# Patient Record
Sex: FEMALE | Race: WHITE | NOT HISPANIC OR LATINO | URBAN - METROPOLITAN AREA
[De-identification: names, ages, dates, MRNs, and addresses within clinical notes are randomized per-mention and may not be internally consistent; named-entity substitution may affect disease eponyms.]

---

## 2023-03-16 ENCOUNTER — INPATIENT (INPATIENT)
Facility: HOSPITAL | Age: 58
LOS: 7 days | Discharge: ANOTHER HOSPITAL | DRG: 432 | End: 2023-03-24
Attending: STUDENT IN AN ORGANIZED HEALTH CARE EDUCATION/TRAINING PROGRAM | Admitting: STUDENT IN AN ORGANIZED HEALTH CARE EDUCATION/TRAINING PROGRAM
Payer: COMMERCIAL

## 2023-03-16 VITALS
OXYGEN SATURATION: 92 % | WEIGHT: 173.5 LBS | RESPIRATION RATE: 18 BRPM | DIASTOLIC BLOOD PRESSURE: 70 MMHG | SYSTOLIC BLOOD PRESSURE: 112 MMHG | TEMPERATURE: 98 F | HEART RATE: 99 BPM

## 2023-03-16 DIAGNOSIS — K72.90 HEPATIC FAILURE, UNSPECIFIED WITHOUT COMA: ICD-10-CM

## 2023-03-16 DIAGNOSIS — I82.0 BUDD-CHIARI SYNDROME: ICD-10-CM

## 2023-03-16 DIAGNOSIS — Z29.9 ENCOUNTER FOR PROPHYLACTIC MEASURES, UNSPECIFIED: ICD-10-CM

## 2023-03-16 DIAGNOSIS — R60.0 LOCALIZED EDEMA: ICD-10-CM

## 2023-03-16 DIAGNOSIS — R18.8 OTHER ASCITES: ICD-10-CM

## 2023-03-16 DIAGNOSIS — F10.10 ALCOHOL ABUSE, UNCOMPLICATED: ICD-10-CM

## 2023-03-16 DIAGNOSIS — R09.89 OTHER SPECIFIED SYMPTOMS AND SIGNS INVOLVING THE CIRCULATORY AND RESPIRATORY SYSTEMS: ICD-10-CM

## 2023-03-16 DIAGNOSIS — K92.2 GASTROINTESTINAL HEMORRHAGE, UNSPECIFIED: ICD-10-CM

## 2023-03-16 LAB
ALBUMIN SERPL ELPH-MCNC: 2.3 G/DL — LOW (ref 3.3–5)
ALP SERPL-CCNC: 162 U/L — HIGH (ref 40–120)
ALT FLD-CCNC: 56 U/L — HIGH (ref 10–45)
ANION GAP SERPL CALC-SCNC: 11 MMOL/L — SIGNIFICANT CHANGE UP (ref 5–17)
ANISOCYTOSIS BLD QL: SLIGHT — SIGNIFICANT CHANGE UP
APTT BLD: 48.2 SEC — HIGH (ref 27.5–35.5)
AST SERPL-CCNC: 175 U/L — HIGH (ref 10–40)
BASOPHILS # BLD AUTO: 0.14 K/UL — SIGNIFICANT CHANGE UP (ref 0–0.2)
BASOPHILS NFR BLD AUTO: 1 % — SIGNIFICANT CHANGE UP (ref 0–2)
BILIRUB DIRECT SERPL-MCNC: >10 MG/DL — HIGH (ref 0–0.3)
BILIRUB INDIRECT FLD-MCNC: <8.1 MG/DL — HIGH (ref 0.2–1)
BILIRUB SERPL-MCNC: 18.1 MG/DL — HIGH (ref 0.2–1.2)
BUN SERPL-MCNC: 6 MG/DL — LOW (ref 7–23)
CALCIUM SERPL-MCNC: 7.7 MG/DL — LOW (ref 8.4–10.5)
CHLORIDE SERPL-SCNC: 96 MMOL/L — SIGNIFICANT CHANGE UP (ref 96–108)
CO2 SERPL-SCNC: 26 MMOL/L — SIGNIFICANT CHANGE UP (ref 22–31)
CREAT SERPL-MCNC: 0.4 MG/DL — LOW (ref 0.5–1.3)
DACRYOCYTES BLD QL SMEAR: SLIGHT — SIGNIFICANT CHANGE UP
EGFR: 115 ML/MIN/1.73M2 — SIGNIFICANT CHANGE UP
EOSINOPHIL # BLD AUTO: 0 K/UL — SIGNIFICANT CHANGE UP (ref 0–0.5)
EOSINOPHIL NFR BLD AUTO: 0 % — SIGNIFICANT CHANGE UP (ref 0–6)
GLUCOSE SERPL-MCNC: 94 MG/DL — SIGNIFICANT CHANGE UP (ref 70–99)
HAV IGM SER-ACNC: SIGNIFICANT CHANGE UP
HBV CORE IGM SER-ACNC: SIGNIFICANT CHANGE UP
HBV SURFACE AG SER-ACNC: SIGNIFICANT CHANGE UP
HCT VFR BLD CALC: 34.8 % — SIGNIFICANT CHANGE UP (ref 34.5–45)
HCV AB S/CO SERPL IA: 0.19 S/CO — SIGNIFICANT CHANGE UP (ref 0–0.99)
HCV AB SERPL-IMP: SIGNIFICANT CHANGE UP
HGB BLD-MCNC: 12 G/DL — SIGNIFICANT CHANGE UP (ref 11.5–15.5)
INR BLD: 2.58 RATIO — HIGH (ref 0.88–1.16)
LYMPHOCYTES # BLD AUTO: 0.29 K/UL — LOW (ref 1–3.3)
LYMPHOCYTES # BLD AUTO: 2 % — LOW (ref 13–44)
MACROCYTES BLD QL: SLIGHT — SIGNIFICANT CHANGE UP
MANUAL SMEAR VERIFICATION: SIGNIFICANT CHANGE UP
MCHC RBC-ENTMCNC: 34.5 GM/DL — SIGNIFICANT CHANGE UP (ref 32–36)
MCHC RBC-ENTMCNC: 36.1 PG — HIGH (ref 27–34)
MCV RBC AUTO: 104.8 FL — HIGH (ref 80–100)
MONOCYTES # BLD AUTO: 1.45 K/UL — HIGH (ref 0–0.9)
MONOCYTES NFR BLD AUTO: 10 % — SIGNIFICANT CHANGE UP (ref 2–14)
NEUTROPHILS # BLD AUTO: 12.6 K/UL — HIGH (ref 1.8–7.4)
NEUTROPHILS NFR BLD AUTO: 86 % — HIGH (ref 43–77)
NEUTS BAND # BLD: 1 % — SIGNIFICANT CHANGE UP (ref 0–8)
NRBC # BLD: 0 /100 — SIGNIFICANT CHANGE UP (ref 0–0)
PLAT MORPH BLD: NORMAL — SIGNIFICANT CHANGE UP
PLATELET # BLD AUTO: 139 K/UL — LOW (ref 150–400)
POIKILOCYTOSIS BLD QL AUTO: SLIGHT — SIGNIFICANT CHANGE UP
POTASSIUM SERPL-MCNC: 3.2 MMOL/L — LOW (ref 3.5–5.3)
POTASSIUM SERPL-SCNC: 3.2 MMOL/L — LOW (ref 3.5–5.3)
PROT SERPL-MCNC: 5.8 G/DL — LOW (ref 6–8.3)
PROTHROM AB SERPL-ACNC: 30 SEC — HIGH (ref 10.5–13.4)
RBC # BLD: 3.32 M/UL — LOW (ref 3.8–5.2)
RBC # FLD: 17.2 % — HIGH (ref 10.3–14.5)
RBC BLD AUTO: ABNORMAL
SARS-COV-2 RNA SPEC QL NAA+PROBE: SIGNIFICANT CHANGE UP
SMUDGE CELLS # BLD: PRESENT — SIGNIFICANT CHANGE UP
SODIUM SERPL-SCNC: 133 MMOL/L — LOW (ref 135–145)
TARGETS BLD QL SMEAR: SLIGHT — SIGNIFICANT CHANGE UP
WBC # BLD: 14.48 K/UL — HIGH (ref 3.8–10.5)
WBC # FLD AUTO: 14.48 K/UL — HIGH (ref 3.8–10.5)

## 2023-03-16 PROCEDURE — 71046 X-RAY EXAM CHEST 2 VIEWS: CPT | Mod: 26

## 2023-03-16 PROCEDURE — 93356 MYOCRD STRAIN IMG SPCKL TRCK: CPT

## 2023-03-16 PROCEDURE — 93306 TTE W/DOPPLER COMPLETE: CPT | Mod: 26

## 2023-03-16 PROCEDURE — 99253 IP/OBS CNSLTJ NEW/EST LOW 45: CPT | Mod: GC

## 2023-03-16 PROCEDURE — 76705 ECHO EXAM OF ABDOMEN: CPT | Mod: 26

## 2023-03-16 PROCEDURE — 93971 EXTREMITY STUDY: CPT | Mod: 26,LT

## 2023-03-16 PROCEDURE — 99223 1ST HOSP IP/OBS HIGH 75: CPT

## 2023-03-16 PROCEDURE — 99221 1ST HOSP IP/OBS SF/LOW 40: CPT

## 2023-03-16 PROCEDURE — 12345: CPT | Mod: NC

## 2023-03-16 PROCEDURE — 93971 EXTREMITY STUDY: CPT | Mod: 26,RT

## 2023-03-16 RX ORDER — FUROSEMIDE 40 MG
40 TABLET ORAL ONCE
Refills: 0 | Status: COMPLETED | OUTPATIENT
Start: 2023-03-16 | End: 2023-03-16

## 2023-03-16 RX ORDER — CEFTRIAXONE 500 MG/1
2000 INJECTION, POWDER, FOR SOLUTION INTRAMUSCULAR; INTRAVENOUS EVERY 24 HOURS
Refills: 0 | Status: DISCONTINUED | OUTPATIENT
Start: 2023-03-17 | End: 2023-03-18

## 2023-03-16 RX ORDER — CEFTRIAXONE 500 MG/1
1000 INJECTION, POWDER, FOR SOLUTION INTRAMUSCULAR; INTRAVENOUS ONCE
Refills: 0 | Status: COMPLETED | OUTPATIENT
Start: 2023-03-16 | End: 2023-03-16

## 2023-03-16 RX ORDER — ONDANSETRON 8 MG/1
4 TABLET, FILM COATED ORAL EVERY 8 HOURS
Refills: 0 | Status: DISCONTINUED | OUTPATIENT
Start: 2023-03-16 | End: 2023-03-24

## 2023-03-16 RX ORDER — THIAMINE MONONITRATE (VIT B1) 100 MG
100 TABLET ORAL DAILY
Refills: 0 | Status: COMPLETED | OUTPATIENT
Start: 2023-03-16 | End: 2023-03-18

## 2023-03-16 RX ORDER — LANOLIN ALCOHOL/MO/W.PET/CERES
3 CREAM (GRAM) TOPICAL AT BEDTIME
Refills: 0 | Status: DISCONTINUED | OUTPATIENT
Start: 2023-03-16 | End: 2023-03-24

## 2023-03-16 RX ORDER — FOLIC ACID 0.8 MG
1 TABLET ORAL DAILY
Refills: 0 | Status: DISCONTINUED | OUTPATIENT
Start: 2023-03-16 | End: 2023-03-24

## 2023-03-16 RX ORDER — CEFTRIAXONE 500 MG/1
1000 INJECTION, POWDER, FOR SOLUTION INTRAMUSCULAR; INTRAVENOUS EVERY 12 HOURS
Refills: 0 | Status: DISCONTINUED | OUTPATIENT
Start: 2023-03-16 | End: 2023-03-16

## 2023-03-16 RX ADMIN — CEFTRIAXONE 100 MILLIGRAM(S): 500 INJECTION, POWDER, FOR SOLUTION INTRAMUSCULAR; INTRAVENOUS at 14:27

## 2023-03-16 RX ADMIN — Medication 40 MILLIGRAM(S): at 17:42

## 2023-03-16 RX ADMIN — Medication 1 MILLIGRAM(S): at 14:27

## 2023-03-16 RX ADMIN — CEFTRIAXONE 100 MILLIGRAM(S): 500 INJECTION, POWDER, FOR SOLUTION INTRAMUSCULAR; INTRAVENOUS at 06:08

## 2023-03-16 RX ADMIN — Medication 100 MILLIGRAM(S): at 14:27

## 2023-03-16 RX ADMIN — Medication 1 TABLET(S): at 14:54

## 2023-03-16 NOTE — PATIENT PROFILE ADULT - NSTRANSFEREYEGLASSESPAIRS_GEN_A_NUR
Problem: Knowledge Deficit  Goal: Patient/family/caregiver demonstrates understanding of disease process, treatment plan, medications, and discharge instructions  Description: Complete learning assessment and assess knowledge base    Interventions:  - Provide teaching at level of understanding  - Provide teaching via preferred learning methods  Outcome: Progressing
1 pair

## 2023-03-16 NOTE — H&P ADULT - NSHPPHYSICALEXAM_GEN_ALL_CORE
Vital Signs Last 24 Hrs  T(C): 36.8 (16 Mar 2023 03:16), Max: 36.8 (16 Mar 2023 03:16)  T(F): 98.3 (16 Mar 2023 03:16), Max: 98.3 (16 Mar 2023 03:16)  HR: 99 (16 Mar 2023 03:16) (99 - 99)  BP: 112/70 (16 Mar 2023 03:16) (112/70 - 112/70)  BP(mean): --  RR: 18 (16 Mar 2023 03:16) (18 - 18)  SpO2: 92% (16 Mar 2023 03:16) (92% - 92%)    Parameters below as of 16 Mar 2023 03:16  Patient On (Oxygen Delivery Method): nasal cannula  O2 Flow (L/min): 2      GENERAL: No acute distress, well-developed, mild tremors/ asterixis   HEAD:  Atraumatic, Normocephalic  ENT: EOMI, PERRLA, sclera icteric ,  moist mucosa no pharyngeal erythema or exudates   NECK: supple , no JVD   CHEST/LUNG: Clear to auscultation bilaterally; No wheeze, equal breath sounds bilaterally   BACK: No spinal tenderness,  No CVA tenderness   HEART: Regular rate and rhythm; No murmurs, rubs, or gallops  ABDOMEN:  + caput medusa , distended , not tense ,  Nontender + shifting dullness ; Bowel sounds present  EXTREMITIES:  No clubbing, cyanosis, +3 pitting  edema on LLE  , 1+ on RLE   MSK: No joint swelling or effusions, ROM intact   PSYCH: Normal behavior/affect  NEUROLOGY: AAOx3, non-focal, cranial nerves intact  SKIN: + jaundice , No rashes or open lesions

## 2023-03-16 NOTE — PROGRESS NOTE ADULT - PROBLEM SELECTOR PLAN 1
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

## 2023-03-16 NOTE — H&P ADULT - PROBLEM SELECTOR PLAN 1
LFt markedly elevated , no prior baseline liver test  for comparison , findings on imaging at  OSH concerning for Budd-Chiari Vs acute alcoholic hepatitis , will obtain updated labs now, hepatology notified   - F/u Liver tests/ coags   - acute hep panel   - hepatology consult ( GI emailed) to be followed up by day team

## 2023-03-16 NOTE — PROGRESS NOTE ADULT - SUBJECTIVE AND OBJECTIVE BOX
Patient is a 58y old  Female who presents with a chief complaint of liver failure (16 Mar 2023 11:56)      SUBJECTIVE / OVERNIGHT EVENTS:    Polish  used and daughter at bedside    feels OK but reports increasing abdominal distention and LE swelling. denies fevers, chills, abd pain, had some blood in her stool 2-3 days ago but now having brown BMs.     ROS:  14 point ROS negative in detail except stated as above    MEDICATIONS  (STANDING):  cefTRIAXone   IVPB 1000 milliGRAM(s) IV Intermittent every 12 hours  folic acid 1 milliGRAM(s) Oral daily  furosemide   Injectable 40 milliGRAM(s) IV Push once  multivitamin 1 Tablet(s) Oral daily  thiamine 100 milliGRAM(s) Oral daily    MEDICATIONS  (PRN):  melatonin 3 milliGRAM(s) Oral at bedtime PRN Insomnia  ondansetron Injectable 4 milliGRAM(s) IV Push every 8 hours PRN Nausea and/or Vomiting      CAPILLARY BLOOD GLUCOSE        I&O's Summary      PHYSICAL EXAM:  Vital Signs Last 24 Hrs  T(C): 36.8 (16 Mar 2023 03:16), Max: 36.8 (16 Mar 2023 03:16)  T(F): 98.3 (16 Mar 2023 03:16), Max: 98.3 (16 Mar 2023 03:16)  HR: 99 (16 Mar 2023 03:16) (99 - 99)  BP: 112/70 (16 Mar 2023 03:16) (112/70 - 112/70)  BP(mean): --  RR: 18 (16 Mar 2023 03:16) (18 - 18)  SpO2: 88% (16 Mar 2023 10:50) (88% - 92%)    Parameters below as of 16 Mar 2023 03:16  Patient On (Oxygen Delivery Method): nasal cannula  O2 Flow (L/min): 2    GENERAL: No acute distress, well-developed, mild tremors/ asterixis   HEAD:  Atraumatic, Normocephalic  ENT: EOMI, PERRL, sclera icteric ,  moist mucosa no pharyngeal erythema or exudates   NECK: supple , no JVD   CHEST/LUNG: Clear to auscultation bilaterally; No wheeze, equal breath sounds bilaterally   BACK: No spinal tenderness,  No CVA tenderness   HEART: Regular rate and rhythm; No murmurs, rubs, or gallops  ABDOMEN: mod distended , not tense ,  Nontender + shifting dullness ; Bowel sounds present  EXTREMITIES:  No clubbing, cyanosis, +3 pitting edema b/l  MSK: No joint swelling or effusions, ROM intact   PSYCH: Normal behavior/affect  NEUROLOGY: AAOx3, non-focal, cranial nerves intact  SKIN: + jaundice , No rashes or open lesions    LABS:                        12.0   14.48 )-----------( 139      ( 16 Mar 2023 07:07 )             34.8     03-16    133<L>  |  96  |  6<L>  ----------------------------<  94  3.2<L>   |  26  |  0.40<L>    Ca    7.7<L>      16 Mar 2023 07:11    TPro  5.8<L>  /  Alb  2.3<L>  /  TBili  18.1<H>  /  DBili  >10.0<H>  /  AST  175<H>  /  ALT  56<H>  /  AlkPhos  162<H>  03-16    PT/INR - ( 16 Mar 2023 07:11 )   PT: 30.0 sec;   INR: 2.58 ratio         PTT - ( 16 Mar 2023 07:11 )  PTT:48.2 sec          RADIOLOGY & ADDITIONAL TESTS:    Imaging Personally Reviewed:    Consultant(s) Notes Reviewed:      Care Discussed with Consultants/Other Providers:  med Np Shannan

## 2023-03-16 NOTE — H&P ADULT - NSHPREVIEWOFSYSTEMS_GEN_ALL_CORE
CONSTITUTIONAL: + weakness, no  fevers + chills  EYES/ENT: No visual changes;  No dysphagia  NECK: No pain or stiffness  RESPIRATORY: No cough, wheezing, hemoptysis; No shortness of breath  CARDIOVASCULAR: No chest pain or palpitations; + lower extremity edema  EXTREMITIES+  le edema, cyanosis, clubbing  GASTROINTESTINAL: No abdominal or epigastric pain. No nausea, vomiting, or hematemesis; No diarrhea or constipation. No melena  + hematochezia.  BACK: No back pain  GENITOURINARY: No dysuria, frequency or hematuria  NEUROLOGICAL: No numbness or weakness  MSK: no joint swelling or pain  SKIN: + jaundice , No itching, burning, rashes, or lesions   PSYCH: no agitation  All other review of systems is negative unless indicated above.

## 2023-03-16 NOTE — PHARMACOTHERAPY INTERVENTION NOTE - COMMENTS
Patient is a 58F EtOH abuse,  transferred from [facility]  for acute liver failure. Patient was initiated on ceftriaxone 1g BID for SBP treatment.     Recommendation:  1. Adjust ceftriaxone dose to 2g daily for SBP treatment      Cole Conner, PharmD  PGY-1 Pharmacy Resident  [facility] [id number]

## 2023-03-16 NOTE — PATIENT PROFILE ADULT - FALL HARM RISK - UNIVERSAL INTERVENTIONS
Bed in lowest position, wheels locked, appropriate side rails in place/Call bell, personal items and telephone in reach/Instruct patient to call for assistance before getting out of bed or chair/Non-slip footwear when patient is out of bed/[place] to call system/Physically safe environment - no spills, clutter or unnecessary equipment/Purposeful Proactive Rounding/Room/bathroom lighting operational, light cord in reach

## 2023-03-16 NOTE — H&P ADULT - PROBLEM SELECTOR PLAN 5
hold off on pharmacologic DVTppx until platelet and INR result , can initiate DVT  ppx during day time  if no contraindication, if contra-indicated , can place on SCds if LLE duplex negative for DVT

## 2023-03-16 NOTE — CONSULT NOTE ADULT - SUBJECTIVE AND OBJECTIVE BOX
Chief Complaint:  Patient is a 58y old  Female who presents with a chief complaint of liver failure (16 Mar 2023 12:13)      HPI:    Otherwise, patient denies fevers, chills, weight loss, dysphagia, odynophagia, early satiety, poor oral intake, abdominal pain, nausea, vomiting, diarrhea, melena, hematemesis, hematochezia, change in stool caliber, or family history of GI-related cancers.    Allergies:  No Known Allergies      Home Medications:    Hospital Medications:  folic acid 1 milliGRAM(s) Oral daily  furosemide   Injectable 40 milliGRAM(s) IV Push once  melatonin 3 milliGRAM(s) Oral at bedtime PRN  multivitamin 1 Tablet(s) Oral daily  ondansetron Injectable 4 milliGRAM(s) IV Push every 8 hours PRN  thiamine 100 milliGRAM(s) Oral daily      PMHX/PSHX:  Alcohol abuse    No significant past surgical history        Family history:  Father with AUD    Social History:   ETOH: +heavy ETOH use over the last few months; had been drinking ETOH before, after and while at work; last drink 3/9/23  Tobacco: denies  Illicit drug use: denies  Occupation: ; last worked 2022  Lives with her  who also drinks; has 2 children- one daughter who lives in [place] and a son in [place]  Went to [facility] (group and individual therapy) starting in 2023 but has still been drinking    ROS: 14 point ROS negative unless otherwise stated in HPI      Vital Signs:  Vital Signs Last 24 Hrs  T(C): 36.7 (16 Mar 2023 13:51), Max: 36.8 (16 Mar 2023 03:16)  T(F): 98 (16 Mar 2023 13:51), Max: 98.3 (16 Mar 2023 03:16)  HR: 85 (16 Mar 2023 13:51) (85 - 99)  BP: 127/74 (16 Mar 2023 13:51) (112/70 - 127/74)  BP(mean): --  RR: 18 (16 Mar 2023 13:51) (18 - 18)  SpO2: 95% (16 Mar 2023 13:51) (88% - 95%)    Parameters below as of 16 Mar 2023 13:51  Patient On (Oxygen Delivery Method): nasal cannula  O2 Flow (L/min): 2    Daily     Daily Weight in k.7 (16 Mar 2023 03:16)    PHYSICAL EXAM:     GENERAL:  Appears stated age, well-groomed, well-nourished, no distress  HEENT:  NC/AT,  conjunctivae clear and pink  CHEST:  Full & symmetric excursion, no increased effort, breath sounds clear  HEART:  Regular rhythm, S1, S2, no murmur/rub/S3/S4  ABDOMEN:  Soft, non-tender, non-distended, normoactive bowel sounds,    EXTREMITIES:  no cyanosis,clubbing or edema  SKIN:  No rash/erythema/ecchymoses/petechiae/wounds/abscess/warm/dry  NEURO:  Alert, oriented      LABS:                        12.0   14.48 )-----------( 139      ( 16 Mar 2023 07:07 )             34.8     03-16    133<L>  |  96  |  6<L>  ----------------------------<  94  3.2<L>   |  26  |  0.40<L>    Ca    7.7<L>      16 Mar 2023 07:11    TPro  5.8<L>  /  Alb  2.3<L>  /  TBili  18.1<H>  /  DBili  >10.0<H>  /  AST  175<H>  /  ALT  56<H>  /  AlkPhos  162<H>  03-16    LIVER FUNCTIONS - ( 16 Mar 2023 07:11 )  Alb: 2.3 g/dL / Pro: 5.8 g/dL / ALK PHOS: 162 U/L / ALT: 56 U/L / AST: 175 U/L / GGT: x           PT/INR - ( 16 Mar 2023 07:11 )   PT: 30.0 sec;   INR: 2.58 ratio         PTT - ( 16 Mar 2023 07:11 )  PTT:48.2 sec        Imaging:     ACC: [id number] EXAM:  US ABDOMEN LIMITED   ORDERED BY: LOUISE AMIN     PROCEDURE DATE:  2023          INTERPRETATION:  CLINICAL INFORMATION: 58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 Evaluate for ascites    COMPARISON: Chest radiograph 3/16/2023    TECHNIQUE: Limited ultrasound of the abdomen to evaluate for ascites.    FINDINGS:    Right Upper Quadrant: Moderate  Right Lower Quadrant: Large  Left Upper Quadrant: Moderate  Left Lower Quadrant: Moderate    OTHER: Small left pleural effusion.      IMPRESSION:  Moderate to large volume abdominopelvic ascites           Chief Complaint:  Patient is a 58y old  Female who presents with a chief complaint of liver failure (16 Mar 2023 12:13)      HPI: 58 year old English and Polish speaking F w/ AUD presented to [facility] on 3/13/23 for 2 weeks of worsening generalized weakness in setting of lower extremity edema yellowing of the eyes, and ascites and was found to have elevated liver enzymes as well as CT findings concerning for Budd-chiarri. She was treated with ABX for possible UTI and SBP prophylaxis and  transferred to [facility] for further evaluation on 3/16. History obtained from pt and her daughter. Over the last 6 months has been drinking more alcohol at home (mostly vodka). Had been drinking ETOH before, after and while at work. Works as a ; last worked 2022. She lives with her  who also drinks (but reportedly drinks less that the pt). Pt has 2 children- one daughter who lives in [place] and a son in [place]. Pt went to [facility] rehab (group and individual therapy) starting in 2023 but has still been drinking despite therapy. Last drink was 3/9/23.      Otherwise, patient denies fevers, chills, weight loss, dysphagia, odynophagia, early satiety, poor oral intake, abdominal pain, nausea, vomiting, diarrhea, melena, hematemesis, hematochezia, change in stool caliber, or family history of GI-related cancers.    Allergies:  No Known Allergies      Home Medications:    Hospital Medications:  folic acid 1 milliGRAM(s) Oral daily  furosemide   Injectable 40 milliGRAM(s) IV Push once  melatonin 3 milliGRAM(s) Oral at bedtime PRN  multivitamin 1 Tablet(s) Oral daily  ondansetron Injectable 4 milliGRAM(s) IV Push every 8 hours PRN  thiamine 100 milliGRAM(s) Oral daily      PMHX/PSHX:  Alcohol abuse    No significant past surgical history        Family history:  Father with AUD    Social History:   ETOH: +heavy ETOH use over the last few months; had been drinking ETOH before, after and while at work; last drink 3/9/23  Tobacco: denies  Illicit drug use: denies  Occupation: ; last worked 2022  Lives with her  who also drinks; has 2 children- one daughter who lives in [place] and a son in [place]  Went to [facility] (group and individual therapy) starting in 2023 but has still been drinking    ROS: 14 point ROS negative unless otherwise stated in HPI      Vital Signs:  Vital Signs Last 24 Hrs  T(C): 36.7 (16 Mar 2023 13:51), Max: 36.8 (16 Mar 2023 03:16)  T(F): 98 (16 Mar 2023 13:51), Max: 98.3 (16 Mar 2023 03:16)  HR: 85 (16 Mar 2023 13:51) (85 - 99)  BP: 127/74 (16 Mar 2023 13:51) (112/70 - 127/74)  BP(mean): --  RR: 18 (16 Mar 2023 13:51) (18 - 18)  SpO2: 95% (16 Mar 2023 13:51) (88% - 95%)    Parameters below as of 16 Mar 2023 13:51  Patient On (Oxygen Delivery Method): nasal cannula  O2 Flow (L/min): 2    Daily     Daily Weight in k.7 (16 Mar 2023 03:16)    PHYSICAL EXAM:     GENERAL:  Appears stated age, well-groomed, +chronically ill appearing  HEENT:  NC/AT, +scleral icterus  CHEST:  Full & symmetric excursion, +bibasilar crackles; +NC  HEART:  Regular rhythm, S1, S2, no murmur/rub/S3/S4  ABDOMEN:  Soft, non-tender, +moderately-distended  EXTREMITIES: 4+ BL edema  SKIN:  +jaundice  NEURO:  Alert, orientedx3, no asterixis      LABS:                        12.0   14.48 )-----------( 139      ( 16 Mar 2023 07:07 )             34.8     03-16    133<L>  |  96  |  6<L>  ----------------------------<  94  3.2<L>   |  26  |  0.40<L>    Ca    7.7<L>      16 Mar 2023 07:11    TPro  5.8<L>  /  Alb  2.3<L>  /  TBili  18.1<H>  /  DBili  >10.0<H>  /  AST  175<H>  /  ALT  56<H>  /  AlkPhos  162<H>  16    LIVER FUNCTIONS - ( 16 Mar 2023 07:11 )  Alb: 2.3 g/dL / Pro: 5.8 g/dL / ALK PHOS: 162 U/L / ALT: 56 U/L / AST: 175 U/L / GGT: x           PT/INR - ( 16 Mar 2023 07:11 )   PT: 30.0 sec;   INR: 2.58 ratio         PTT - ( 16 Mar 2023 07:11 )  PTT:48.2 sec        Imaging:     ACC: [id number] EXAM:  US ABDOMEN LIMITED   ORDERED BY: LOUISE AMIN     PROCEDURE DATE:  2023          INTERPRETATION:  CLINICAL INFORMATION: 58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 Evaluate for ascites    COMPARISON: Chest radiograph 3/16/2023    TECHNIQUE: Limited ultrasound of the abdomen to evaluate for ascites.    FINDINGS:    Right Upper Quadrant: Moderate  Right Lower Quadrant: Large  Left Upper Quadrant: Moderate  Left Lower Quadrant: Moderate    OTHER: Small left pleural effusion.      IMPRESSION:  Moderate to large volume abdominopelvic ascites

## 2023-03-16 NOTE — H&P ADULT - NSHPLABSRESULTS_GEN_ALL_CORE
Labs from OSH reviewed   Labs ordered     Imaging:  CT AP from OSH report reviewed: segmental compression of intrahepatic inferior IVC c/f Bud Chiari

## 2023-03-16 NOTE — CONSULT NOTE ADULT - SUBJECTIVE AND OBJECTIVE BOX
Interventional Radiology    Evaluate for Procedure:   para dx + tx     HPI:  58 year old female w/ Hx  of EtOH abuse,  presented to [facility] on 3/13/23 for worsening  generalized weakness in setting of lower extremity edema and ascites over the past two weeks , LFT's markedly elevated , no prior baseline liver test  for comparison , findings on imaging at  OSH concerning for Budd-Chiari Vs acute alcoholic hepatitis. Hepatology consulted  IR consulted on 3/16 for diagnotic and therapeutic paracentesis.     Allergies:   Medications (Abx/Cardiac/Anticoagulation/Blood Products)  cefTRIAXone   IVPB: 100 mL/Hr IV Intermittent (03-16 @ 06:08)    Data:    T(C): 36.8  HR: 99  BP: 112/70  RR: 18  SpO2: 88%    -WBC 14.48 / HgB 12.0 / Hct 34.8 / Plt 139  -Na 133 / Cl 96 / BUN 6 / Glucose 94  -K 3.2 / CO2 26 / Cr 0.40  -ALT 56 / Alk Phos 162 / T.Bili 18.1  -INR 2.58 / PTT 48.2    Radiology:     Assessment/Plan:   58 year old female w/ Hx  of EtOH abuse,  presented to [facility] on 3/13/23 for worsening  generalized weakness in setting of lower extremity edema and ascites over the past two weeks , LFT's markedly elevated , no prior baseline liver test  for comparison , findings on imaging at  OSH concerning for Budd-Chiari Vs acute alcoholic hepatitis. Hepatology consulted  IR consulted on 3/16 for diagnotic and therapeutic paracentesis.      - case reviewed and IR awaiting for abdominal Ultrasound prior to scheduling patient for para.       Interventional Radiology    Evaluate for Procedure:   para dx + tx     HPI:  58 year old female w/ Hx  of EtOH abuse,  presented to [facility] on 3/13/23 for worsening  generalized weakness in setting of lower extremity edema and ascites over the past two weeks , LFT's markedly elevated , no prior baseline liver test  for comparison , findings on imaging at  OSH concerning for Budd-Chiari Vs acute alcoholic hepatitis. Hepatology consulted  IR consulted on 3/16 for diagnotic and therapeutic paracentesis.     Allergies:   Medications (Abx/Cardiac/Anticoagulation/Blood Products)  cefTRIAXone   IVPB: 100 mL/Hr IV Intermittent (03-16 @ 06:08)    Data:    T(C): 36.8  HR: 99  BP: 112/70  RR: 18  SpO2: 88%    -WBC 14.48 / HgB 12.0 / Hct 34.8 / Plt 139  -Na 133 / Cl 96 / BUN 6 / Glucose 94  -K 3.2 / CO2 26 / Cr 0.40  -ALT 56 / Alk Phos 162 / T.Bili 18.1  -INR 2.58 / PTT 48.2    Radiology:     Assessment/Plan:   58 year old female w/ Hx  of EtOH abuse,  presented to [facility] on 3/13/23 for worsening  generalized weakness in setting of lower extremity edema and ascites over the past two weeks , LFT's markedly elevated , no prior baseline liver test  for comparison , findings on imaging at  OSH concerning for Budd-Chiari Vs acute alcoholic hepatitis. Hepatology consulted  IR consulted on 3/16 for diagnotic and therapeutic paracentesis.       - case reviewed and approved for 3/16  IR guided  tx+dx para today   - US 3/16 demonstrates moderate size ascites amenable for para.   - please place IR procedure order under PA Jalili   - COVID PCR results within 5 days of planned procedure  - d/w primary team      Sadaf Jalili, IR PA-C, available on TEAMS or IR callback [phone]   Interventional Radiology    Evaluate for Procedure:   para dx + tx     HPI:  58 year old female w/ Hx  of EtOH abuse,  presented to [facility] on 3/13/23 for worsening  generalized weakness in setting of lower extremity edema and ascites over the past two weeks , LFT's markedly elevated , no prior baseline liver test  for comparison , findings on imaging at  OSH concerning for Budd-Chiari Vs acute alcoholic hepatitis. Hepatology consulted  IR consulted on 3/16 for diagnotic and therapeutic paracentesis.     Allergies:   Medications (Abx/Cardiac/Anticoagulation/Blood Products)  cefTRIAXone   IVPB: 100 mL/Hr IV Intermittent (03-16 @ 06:08)    Data:    T(C): 36.8  HR: 99  BP: 112/70  RR: 18  SpO2: 88%    -WBC 14.48 / HgB 12.0 / Hct 34.8 / Plt 139  -Na 133 / Cl 96 / BUN 6 / Glucose 94  -K 3.2 / CO2 26 / Cr 0.40  -ALT 56 / Alk Phos 162 / T.Bili 18.1  -INR 2.58 / PTT 48.2    Radiology:     Assessment/Plan:   58 year old female w/ Hx  of EtOH abuse,  presented to [facility] on 3/13/23 for worsening  generalized weakness in setting of lower extremity edema and ascites over the past two weeks , LFT's markedly elevated , no prior baseline liver test  for comparison , findings on imaging at  OSH concerning for Budd-Chiari Vs acute alcoholic hepatitis. Hepatology consulted  IR consulted on 3/16 for diagnotic and therapeutic paracentesis.       - case reviewed and approved for 3/17  IR guided  tx+dx para   - US 3/16 demonstrates moderate size ascites amenable for para.   - please place IR procedure order under PA Jalili   - COVID PCR results within 5 days of planned procedure  - d/w primary team      Sadaf Jalili, IR PA-C, available on TEAMS or IR callback [phone]

## 2023-03-16 NOTE — CONSULT NOTE ADULT - ASSESSMENT
-nutrition/PT   -infectious workup- bl cx x2, UA + Ucx, dx/tx para- f/u ascitic fluid analysis  -US abd + doppler      **THIS NOTE IS NOT FINALIZED UNTIL SIGNED BY THE ATTENDING**    Chely Randall MD  GI Fellow, PGY-5  Available via Microsoft Teams    NON-URGENT CONSULTS:  Please email [email or web address]  AT NIGHT AND ON WEEKENDS:  Contact on-call GI fellow via answering service ([phone]) from 5pm-8am and on weekends/holidays  MONDAY-FRIDAY 8AM-5PM:  Pager# [phone] ([facility]) or [phone] ([facility])  GI Phone# [phone] ([facility])   58 year old English and Polish speaking F w/ AUD presented to 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 She was treated with ABX for possible UTI and SBP prophylaxis and  transferred to [facility] for further evaluation on 3/16. History obtained from pt and her daughter. Over the last 6 months has been drinking more alcohol at home (mostly vodka). Had been drinking ETOH before, after and while at work. Works as a ; last worked 12/2022. She lives with her  who also drinks (but reportedly drinks less that the pt). Pt has 2 children- one daughter who lives in [place] and a son in [place]. Pt went to [facility] ETOH rehab (group and individual therapy) starting in 01/2023 but has still been drinking despite therapy. Last drink was 3/9/23.      #AUD- MDF 94 (poor prognosis)  #c/f Budd-Chiari    Recs:  -please consult nutrition/PT/BH/SW   -infectious workup- bl cx x2, UA + Ucx, dx/tx para- f/u ascitic fluid analysis  -pending infectious workup will decide if pt is a candidate for steroids  -US abd + doppler  -upload images from [facility]  -daily CMP, CBC, INR  -c/w CTX for now for SBP ppx  -DVT ppx  -obtain TTE  -check Hep B surface Ag, Hep B core Ab, Hep C Ab + reflex RNA  -check serum MIKO, smooth muscle Ab, anti-LKM-1 Ab, alpha-1 antitrypsin, immunoglobulin panel       **THIS NOTE IS NOT FINALIZED UNTIL SIGNED BY THE ATTENDING**    Chely Randall MD  GI Fellow, PGY-5  Available via Microsoft Teams    NON-URGENT CONSULTS:  Please email [email or web address]  [email or web address]  AT NIGHT AND ON WEEKENDS:  Contact on-call GI fellow via answering service ([phone]) from 5pm-8am and on weekends/holidays  MONDAY-FRIDAY 8AM-5PM:  Pager# [phone] ([facility]) or [phone] ([facility])  GI Phone# [phone] ([facility])

## 2023-03-16 NOTE — PROGRESS NOTE ADULT - PROBLEM SELECTOR PLAN 3
could be 2/2 to HF v some portal hypertension  check 2d echo  check va duplex to r/o DVT  give lasix 40mg Iv x 1 and monitor ins ands outs

## 2023-03-16 NOTE — H&P ADULT - HISTORY OF PRESENT ILLNESS
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     Patient reports last drink was about two weeks prior to admission , she reports no headache, no abdominal pain , no nausea or vomiting , no visual , tactile or auditory hallucinations. Patient reports increased lower extremity edema , as well as abdominal girth , she reports no prior history of ascites, she was started on spironolactone about two weeks prior to admission , with minimal relief.She also reports yellowing of skin and eyes during this time.  She reports no fever but does have chills. Patient also reports loose stool sometime with red blood , no melena no hematemesis.

## 2023-03-16 NOTE — H&P ADULT - NSHPSOCIALHISTORY_GEN_ALL_CORE
, lives with    + alcohol , 2-3 glasses of vodka daily , sometime more , reports last use > 2 weeks PTA

## 2023-03-16 NOTE — H&P ADULT - PROBLEM SELECTOR PLAN 2
per patient some blood mixed with stool , was started on SBP PPX at OSH   - ceftriaxone for SBP ppx   - monitor H/H

## 2023-03-16 NOTE — H&P ADULT - PROBLEM SELECTOR PLAN 3
mild tremors , no other symptoms of withdrawal, reports last drink 2 wks ago , however , has only been at OSH for <3 days , will monitor for w/d  - monitor on CIWA   - Ativan PRN   - MV/Thiamine   - monitor mag and phos   - alcohol cessation counseling top be arranged by day team

## 2023-03-17 ENCOUNTER — RESULT REVIEW (OUTPATIENT)
Age: 58
End: 2023-03-17

## 2023-03-17 DIAGNOSIS — J96.01 ACUTE RESPIRATORY FAILURE WITH HYPOXIA: ICD-10-CM

## 2023-03-17 LAB
A1AT SERPL-MCNC: 166 MG/DL — SIGNIFICANT CHANGE UP (ref 90–200)
ALBUMIN FLD-MCNC: 0.3 G/DL — SIGNIFICANT CHANGE UP
ALBUMIN SERPL ELPH-MCNC: 1.8 G/DL — LOW (ref 3.3–5)
ALP SERPL-CCNC: 154 U/L — HIGH (ref 40–120)
ALT FLD-CCNC: 58 U/L — HIGH (ref 10–45)
AMMONIA BLD-MCNC: 78 UMOL/L — HIGH (ref 11–55)
ANION GAP SERPL CALC-SCNC: 12 MMOL/L — SIGNIFICANT CHANGE UP (ref 5–17)
APTT BLD: 47.1 SEC — HIGH (ref 27.5–35.5)
AST SERPL-CCNC: 171 U/L — HIGH (ref 10–40)
B PERT IGG+IGM PNL SER: CLEAR — SIGNIFICANT CHANGE UP
BILIRUB SERPL-MCNC: 17.9 MG/DL — HIGH (ref 0.2–1.2)
BUN SERPL-MCNC: 5 MG/DL — LOW (ref 7–23)
CALCIUM SERPL-MCNC: 7.2 MG/DL — LOW (ref 8.4–10.5)
CHLORIDE SERPL-SCNC: 97 MMOL/L — SIGNIFICANT CHANGE UP (ref 96–108)
CO2 SERPL-SCNC: 26 MMOL/L — SIGNIFICANT CHANGE UP (ref 22–31)
COLOR FLD: YELLOW — SIGNIFICANT CHANGE UP
CREAT SERPL-MCNC: 0.42 MG/DL — LOW (ref 0.5–1.3)
EGFR: 114 ML/MIN/1.73M2 — SIGNIFICANT CHANGE UP
EOSINOPHIL # FLD: 1 % — SIGNIFICANT CHANGE UP
FLUID INTAKE SUBSTANCE CLASS: SIGNIFICANT CHANGE UP
GLUCOSE FLD-MCNC: 116 MG/DL — SIGNIFICANT CHANGE UP
GLUCOSE SERPL-MCNC: 73 MG/DL — SIGNIFICANT CHANGE UP (ref 70–99)
GRAM STN FLD: SIGNIFICANT CHANGE UP
HCT VFR BLD CALC: 33.2 % — LOW (ref 34.5–45)
HGB BLD-MCNC: 11.4 G/DL — LOW (ref 11.5–15.5)
INR BLD: 2.49 RATIO — HIGH (ref 0.88–1.16)
LDH SERPL L TO P-CCNC: 65 U/L — SIGNIFICANT CHANGE UP
LYMPHOCYTES # FLD: 61 % — SIGNIFICANT CHANGE UP
MCHC RBC-ENTMCNC: 34.3 GM/DL — SIGNIFICANT CHANGE UP (ref 32–36)
MCHC RBC-ENTMCNC: 36.2 PG — HIGH (ref 27–34)
MCV RBC AUTO: 105.4 FL — HIGH (ref 80–100)
MELD SCORE WITH DIALYSIS: >40 POINTS — SIGNIFICANT CHANGE UP
MELD SCORE WITHOUT DIALYSIS: 29 POINTS — SIGNIFICANT CHANGE UP
MESOTHL CELL # FLD: 6 % — SIGNIFICANT CHANGE UP
MONOS+MACROS # FLD: 28 % — SIGNIFICANT CHANGE UP
NEUTROPHILS-BODY FLUID: 4 % — SIGNIFICANT CHANGE UP
NRBC # BLD: 0 /100 WBCS — SIGNIFICANT CHANGE UP (ref 0–0)
PLATELET # BLD AUTO: 165 K/UL — SIGNIFICANT CHANGE UP (ref 150–400)
POTASSIUM SERPL-MCNC: 3.2 MMOL/L — LOW (ref 3.5–5.3)
POTASSIUM SERPL-SCNC: 3.2 MMOL/L — LOW (ref 3.5–5.3)
PROT FLD-MCNC: 0.7 G/DL — SIGNIFICANT CHANGE UP
PROT SERPL-MCNC: 5.6 G/DL — LOW (ref 6–8.3)
PROTHROM AB SERPL-ACNC: 29.2 SEC — HIGH (ref 10.5–13.4)
RBC # BLD: 3.15 M/UL — LOW (ref 3.8–5.2)
RBC # FLD: 17.1 % — HIGH (ref 10.3–14.5)
RCV VOL RI: < 2000 /UL — SIGNIFICANT CHANGE UP (ref 0–0)
SODIUM SERPL-SCNC: 135 MMOL/L — SIGNIFICANT CHANGE UP (ref 135–145)
SPECIMEN SOURCE: SIGNIFICANT CHANGE UP
TOTAL NUCLEATED CELL COUNT, BODY FLUID: 68 /UL — SIGNIFICANT CHANGE UP
TUBE TYPE: SIGNIFICANT CHANGE UP
WBC # BLD: 13.89 K/UL — HIGH (ref 3.8–10.5)
WBC # FLD AUTO: 13.89 K/UL — HIGH (ref 3.8–10.5)

## 2023-03-17 PROCEDURE — 88305 TISSUE EXAM BY PATHOLOGIST: CPT | Mod: 26

## 2023-03-17 PROCEDURE — 99232 SBSQ HOSP IP/OBS MODERATE 35: CPT | Mod: GC

## 2023-03-17 PROCEDURE — 88112 CYTOPATH CELL ENHANCE TECH: CPT | Mod: 26

## 2023-03-17 PROCEDURE — 93975 VASCULAR STUDY: CPT | Mod: 26

## 2023-03-17 PROCEDURE — 49083 ABD PARACENTESIS W/IMAGING: CPT

## 2023-03-17 PROCEDURE — 99232 SBSQ HOSP IP/OBS MODERATE 35: CPT

## 2023-03-17 RX ORDER — SPIRONOLACTONE 25 MG/1
50 TABLET, FILM COATED ORAL DAILY
Refills: 0 | Status: DISCONTINUED | OUTPATIENT
Start: 2023-03-18 | End: 2023-03-19

## 2023-03-17 RX ORDER — POTASSIUM CHLORIDE 20 MEQ
40 PACKET (EA) ORAL EVERY 4 HOURS
Refills: 0 | Status: COMPLETED | OUTPATIENT
Start: 2023-03-17 | End: 2023-03-17

## 2023-03-17 RX ORDER — FUROSEMIDE 40 MG
20 TABLET ORAL DAILY
Refills: 0 | Status: DISCONTINUED | OUTPATIENT
Start: 2023-03-18 | End: 2023-03-19

## 2023-03-17 RX ORDER — ENOXAPARIN SODIUM 100 MG/ML
40 INJECTION SUBCUTANEOUS EVERY 24 HOURS
Refills: 0 | Status: DISCONTINUED | OUTPATIENT
Start: 2023-03-17 | End: 2023-03-22

## 2023-03-17 RX ORDER — FUROSEMIDE 40 MG
40 TABLET ORAL DAILY
Refills: 0 | Status: DISCONTINUED | OUTPATIENT
Start: 2023-03-17 | End: 2023-03-17

## 2023-03-17 RX ADMIN — Medication 40 MILLIEQUIVALENT(S): at 15:06

## 2023-03-17 RX ADMIN — Medication 1 MILLIGRAM(S): at 11:37

## 2023-03-17 RX ADMIN — CEFTRIAXONE 100 MILLIGRAM(S): 500 INJECTION, POWDER, FOR SOLUTION INTRAMUSCULAR; INTRAVENOUS at 06:07

## 2023-03-17 RX ADMIN — Medication 40 MILLIEQUIVALENT(S): at 17:35

## 2023-03-17 RX ADMIN — Medication 100 MILLIGRAM(S): at 11:37

## 2023-03-17 RX ADMIN — Medication 40 MILLIGRAM(S): at 11:36

## 2023-03-17 RX ADMIN — ENOXAPARIN SODIUM 40 MILLIGRAM(S): 100 INJECTION SUBCUTANEOUS at 17:35

## 2023-03-17 RX ADMIN — Medication 1 TABLET(S): at 11:37

## 2023-03-17 NOTE — PROGRESS NOTE ADULT - SUBJECTIVE AND OBJECTIVE BOX
Patient is a 58y old  Female who presents with a chief complaint of liver failure (17 Mar 2023 06:27)      SUBJECTIVE / OVERNIGHT EVENTS:    Polish  # [id number]    feels well. no n/v/d abdominal distention improved s/p 3.8 L removal of ascitic fluid    ROS:  14 point ROS negative in detail except stated as above    MEDICATIONS  (STANDING):  cefTRIAXone   IVPB 2000 milliGRAM(s) IV Intermittent every 24 hours  enoxaparin Injectable 40 milliGRAM(s) SubCutaneous every 24 hours  folic acid 1 milliGRAM(s) Oral daily  furosemide   Injectable 40 milliGRAM(s) IV Push daily  multivitamin 1 Tablet(s) Oral daily  potassium chloride    Tablet ER 40 milliEquivalent(s) Oral every 4 hours  thiamine 100 milliGRAM(s) Oral daily    MEDICATIONS  (PRN):  melatonin 3 milliGRAM(s) Oral at bedtime PRN Insomnia  ondansetron Injectable 4 milliGRAM(s) IV Push every 8 hours PRN Nausea and/or Vomiting      CAPILLARY BLOOD GLUCOSE        I&O's Summary    16 Mar 2023 07:01  -  17 Mar 2023 07:00  --------------------------------------------------------  IN: 480 mL / OUT: 900 mL / NET: -420 mL        PHYSICAL EXAM:  Vital Signs Last 24 Hrs  T(C): 36.7 (17 Mar 2023 12:10), Max: 36.8 (16 Mar 2023 20:41)  T(F): 98.1 (17 Mar 2023 12:10), Max: 98.2 (16 Mar 2023 20:41)  HR: 104 (17 Mar 2023 12:10) (85 - 104)  BP: 108/64 (17 Mar 2023 12:10) (101/70 - 127/74)  BP(mean): --  RR: 18 (17 Mar 2023 12:10) (18 - 18)  SpO2: 92% (17 Mar 2023 12:10) (92% - 96%)    Parameters below as of 17 Mar 2023 12:10  Patient On (Oxygen Delivery Method): nasal cannula  O2 Flow (L/min): 2.5    GENERAL: No acute distress, well-developed, mild tremors/ asterixis   HEAD:  Atraumatic, Normocephalic  ENT: EOMI, PERRL, sclera icteric ,  moist mucosa no pharyngeal erythema or exudates   NECK: supple , no JVD   CHEST/LUNG: Clear to auscultation bilaterally; No wheeze, equal breath sounds bilaterally   BACK: No spinal tenderness,  No CVA tenderness   HEART: Regular rate and rhythm; No murmurs, rubs, or gallops  ABDOMEN: mod distended , not tense ,  Nontender + shifting dullness ; Bowel sounds present  EXTREMITIES:  No clubbing, cyanosis, +3 pitting edema b/l  MSK: No joint swelling or effusions, ROM intact   PSYCH: Normal behavior/affect  NEUROLOGY: AAOx3, non-focal, cranial nerves intact  SKIN: + jaundice , No rashes or open lesions      LABS:                        11.4   13.89 )-----------( 165      ( 17 Mar 2023 07:13 )             33.2     03-17    135  |  97  |  5<L>  ----------------------------<  73  3.2<L>   |  26  |  0.42<L>    Ca    7.2<L>      17 Mar 2023 07:11    TPro  5.6<L>  /  Alb  1.8<L>  /  TBili  17.9<H>  /  DBili  x   /  AST  171<H>  /  ALT  58<H>  /  AlkPhos  154<H>  03-17    PT/INR - ( 17 Mar 2023 07:16 )   PT: 29.2 sec;   INR: 2.49 ratio         PTT - ( 17 Mar 2023 07:16 )  PTT:47.1 sec          RADIOLOGY & ADDITIONAL TESTS:    Imaging Personally Reviewed:    Consultant(s) Notes Reviewed:      Care Discussed with Consultants/Other Providers:  med Np Maru

## 2023-03-17 NOTE — PROGRESS NOTE ADULT - PROBLEM SELECTOR PLAN 3
could be 2/2 to HF v some portal hypertension  Transthoracic EchocardiogramNormal left ventricular systolic function. No segmental  wall motion abnormalities. 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 Moderate mitral  regurgitation.  cards eval  b/l duplex neg for DVT  give lasix 40mg IV 3/16 and 3/17 will hold for soft SBP - states last drink 2 weeks ago, at OSH for 3 days  - MV/Thiamine   - monitor mag and phos

## 2023-03-17 NOTE — PHYSICAL THERAPY INITIAL EVALUATION ADULT - STRENGTHENING, PT EVAL
GOAL: Patient will demonstrate a 1/3 increase in strength where deficient to assist with performing functional mobility and ADLs in 3 weeks.

## 2023-03-17 NOTE — PROGRESS NOTE ADULT - SUBJECTIVE AND OBJECTIVE BOX
Chief Complaint:  Patient is a 58y old  Female who presents with a chief complaint of liver failure (16 Mar 2023 15:56)      Interval Events: Reports feeling well. Denies any N/V/D/C, abd pain, melena or hematochezia.      Hospital Medications:  cefTRIAXone   IVPB 2000 milliGRAM(s) IV Intermittent every 24 hours  folic acid 1 milliGRAM(s) Oral daily  melatonin 3 milliGRAM(s) Oral at bedtime PRN  multivitamin 1 Tablet(s) Oral daily  ondansetron Injectable 4 milliGRAM(s) IV Push every 8 hours PRN  thiamine 100 milliGRAM(s) Oral daily      PMHX/PSHX:  Alcohol abuse    No significant past surgical history            ROS: 14 point ROS negative unless otherwise stated in subjective      PHYSICAL EXAM:     GENERAL:  Appears stated age, well-groomed, +chronically ill appearing  HEENT:  NC/AT, +scleral icterus  CHEST:  Full & symmetric excursion, +bibasilar crackles; +NC  HEART:  Regular rhythm, S1, S2, no murmur/rub/S3/S4  ABDOMEN:  Soft, non-tender, +moderately-distended  EXTREMITIES: 4+ BL edema  SKIN:  +jaundice  NEURO:  Alert, orientedx3, no asterixis    Vital Signs:  Vital Signs Last 24 Hrs  T(C): 36.7 (17 Mar 2023 04:23), Max: 36.8 (16 Mar 2023 20:41)  T(F): 98.1 (17 Mar 2023 04:23), Max: 98.2 (16 Mar 2023 20:41)  HR: 95 (17 Mar 2023 04:23) (85 - 100)  BP: 101/70 (17 Mar 2023 04:23) (101/70 - 127/74)  BP(mean): --  RR: 18 (17 Mar 2023 04:23) (18 - 18)  SpO2: 95% (17 Mar 2023 04:23) (88% - 96%)    Parameters below as of 17 Mar 2023 04:23  Patient On (Oxygen Delivery Method): nasal cannula  O2 Flow (L/min): 2.5    Daily     Daily     LABS:                        12.0   14.48 )-----------( 139      ( 16 Mar 2023 07:07 )             34.8     03-16    133<L>  |  96  |  6<L>  ----------------------------<  94  3.2<L>   |  26  |  0.40<L>    Ca    7.7<L>      16 Mar 2023 07:11    TPro  5.8<L>  /  Alb  2.3<L>  /  TBili  18.1<H>  /  DBili  >10.0<H>  /  AST  175<H>  /  ALT  56<H>  /  AlkPhos  162<H>  03-16    LIVER FUNCTIONS - ( 16 Mar 2023 07:11 )  Alb: 2.3 g/dL / Pro: 5.8 g/dL / ALK PHOS: 162 U/L / ALT: 56 U/L / AST: 175 U/L / GGT: x           PT/INR - ( 16 Mar 2023 07:11 )   PT: 30.0 sec;   INR: 2.58 ratio         PTT - ( 16 Mar 2023 07:11 )  PTT:48.2 sec        Imaging: No new abdominal imaging               Chief Complaint:  Patient is a 58y old  Female who presents with a chief complaint of liver failure (16 Mar 2023 15:56)      Interval Events: Reports improved s/p paracentesis this AM.  -s/p para with 3.9 L removed, neg for SBP.  -US venous duplex (3/16) BL LE neg for DVT.    Hospital Medications:  cefTRIAXone   IVPB 2000 milliGRAM(s) IV Intermittent every 24 hours  folic acid 1 milliGRAM(s) Oral daily  melatonin 3 milliGRAM(s) Oral at bedtime PRN  multivitamin 1 Tablet(s) Oral daily  ondansetron Injectable 4 milliGRAM(s) IV Push every 8 hours PRN  thiamine 100 milliGRAM(s) Oral daily      PMHX/PSHX:  Alcohol abuse    No significant past surgical history            ROS: 14 point ROS negative unless otherwise stated in subjective      PHYSICAL EXAM:     GENERAL:  Appears stated age, well-groomed, +chronically ill appearing  HEENT:  NC/AT, +scleral icterus  CHEST:  Full & symmetric excursion, +bibasilar crackles; +NC  HEART:  Regular rhythm, S1, S2, no murmur/rub/S3/S4  ABDOMEN:  Soft, non-tender, +mildly-distended  EXTREMITIES: 3+ BL edema  SKIN:  +jaundice  NEURO:  Alert, orientedx3, no asterixis    Vital Signs:  Vital Signs Last 24 Hrs  T(C): 36.7 (17 Mar 2023 04:23), Max: 36.8 (16 Mar 2023 20:41)  T(F): 98.1 (17 Mar 2023 04:23), Max: 98.2 (16 Mar 2023 20:41)  HR: 95 (17 Mar 2023 04:23) (85 - 100)  BP: 101/70 (17 Mar 2023 04:23) (101/70 - 127/74)  BP(mean): --  RR: 18 (17 Mar 2023 04:23) (18 - 18)  SpO2: 95% (17 Mar 2023 04:23) (88% - 96%)    Parameters below as of 17 Mar 2023 04:23  Patient On (Oxygen Delivery Method): nasal cannula  O2 Flow (L/min): 2.5    Daily     Daily     LABS:                        12.0   14.48 )-----------( 139      ( 16 Mar 2023 07:07 )             34.8     03-16    133<L>  |  96  |  6<L>  ----------------------------<  94  3.2<L>   |  26  |  0.40<L>    Ca    7.7<L>      16 Mar 2023 07:11    TPro  5.8<L>  /  Alb  2.3<L>  /  TBili  18.1<H>  /  DBili  >10.0<H>  /  AST  175<H>  /  ALT  56<H>  /  AlkPhos  162<H>  03-16    LIVER FUNCTIONS - ( 16 Mar 2023 07:11 )  Alb: 2.3 g/dL / Pro: 5.8 g/dL / ALK PHOS: 162 U/L / ALT: 56 U/L / AST: 175 U/L / GGT: x           PT/INR - ( 16 Mar 2023 07:11 )   PT: 30.0 sec;   INR: 2.58 ratio         PTT - ( 16 Mar 2023 07:11 )  PTT:48.2 sec        Imaging: No new abdominal imaging

## 2023-03-17 NOTE — PRE PROCEDURE NOTE - PRE PROCEDURE EVALUATION
Interventional Radiology    HPI:  58 year old female w/ Hx  of EtOH abuse,  presented to [facility] on 3/13/23 for worsening  generalized weakness in setting of lower extremity edema and ascites over the past two weeks , LFT's markedly elevated , no prior baseline liver test  for comparison , findings on imaging at  OSH concerning for Budd-Chiari Vs acute alcoholic hepatitis. Hepatology consulted  IR consulted on 3/16 for diagnotic and therapeutic paracentesis.     Allergies:   Medications (Abx/Cardiac/Anticoagulation/Blood Products)  cefTRIAXone   IVPB: 100 mL/Hr IV Intermittent (03-16 @ 06:08)  cefTRIAXone   IVPB: 100 mL/Hr IV Intermittent (03-16 @ 14:27)  cefTRIAXone   IVPB: 100 mL/Hr IV Intermittent (03-17 @ 06:07)  furosemide   Injectable: 40 milliGRAM(s) IV Push (03-16 @ 17:42)    Data:    T(C): 36.7  HR: 95  BP: 101/70  RR: 18  SpO2: 95%    Exam  General: No acute distress  Chest: Non labored breathing  Abdomen: Non-distended  Extremities: No swelling, warm    -WBC 13.89 / HgB 11.4 / Hct 33.2 / Plt 165  -Na 135 / Cl 97 / BUN 5 / Glucose 73  -K 3.2 / CO2 26 / Cr 0.42  -ALT 58 / Alk Phos 154 / T.Bili 17.9  -INR2.49    Imaging: Reviewed     Plan: 58y Female presents for diagnotic and therapeutic paracentesis.  -Risks/Benefits/alternatives explained with the patient and/or healthcare proxy and witnessed informed consent obtained.

## 2023-03-17 NOTE — PHYSICAL THERAPY INITIAL EVALUATION ADULT - PERTINENT HX OF CURRENT PROBLEM, REHAB EVAL
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 Patient reports last drink was about two weeks prior to admission , she reports no headache, no abdominal pain , no nausea or vomiting , no visual , tactile or auditory hallucinations. Patient reports increased lower extremity edema , as well as abdominal girth , she reports no prior history of ascites, she was started on spironolactone about two weeks prior to admission , with minimal relief.She also reports yellowing of skin and eyes during this time.  She reports no fever but does have chills. Patient also reports loose stool sometime with red blood , no melena no hematemesis. Hosp Course: 3/16 VA Duplex LLE neg for DVT; 3/16 CXR Small bilateral pleural effusions R > L, borderline cardiomegaly; 3/16 US Abdomen mod to large volume abdominopelvic ascites, small L pleural effusion; 3/16 RLE VA duplex neg for DVT Patient is a 58 year old female w/ Hx of EtOH abuse, presented to 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 Patient reports last drink was about two weeks prior to admission , she reports no headache, no abdominal pain , no nausea or vomiting , no visual , tactile or auditory hallucinations. Patient reports increased lower extremity edema , as well as abdominal girth , she reports no prior history of ascites, she was started on spironolactone about two weeks prior to admission , with minimal relief.She also reports yellowing of skin and eyes during this time.  She reports no fever but does have chills. Patient also reports loose stool sometime with red blood, no melena no hematemesis. Hosp Course: 3/16 VA Duplex LLE neg for DVT; 3/16 CXR Small bilateral pleural effusions R > L, borderline cardiomegaly; 3/16 US Abdomen mod to large volume abdominopelvic ascites, small L pleural effusion; 3/16 RLE VA duplex neg for DVT

## 2023-03-17 NOTE — PHYSICAL THERAPY INITIAL EVALUATION ADULT - ADDITIONAL COMMENTS
Per care coordination note 3/16, pt states that she lives in a pvt apartment with her  +6 steps to enter. Pt reports that she was independent with all ADLs and functional mobility prior to admission. Pt was previously at [facility] prior to transfer to [facility] for liver evaluation. Pt states that she lives in a pvt house with her  with a first floor setup +6 steps to enter. Pt reports that she was independent with all ADLs and functional mobility prior to admission. Pt was previously at [facility] prior to transfer to [facility] for liver evaluation.

## 2023-03-17 NOTE — PHYSICAL THERAPY INITIAL EVALUATION ADULT - BALANCE TRAINING, PT EVAL
GOAL: Patient will demonstrate an increase in static/dynamic balance in sitting/standing where deficient by at least 1 grade to facilitate greater independence during functional mobility and ADL's in 3 weeks.

## 2023-03-17 NOTE — PROGRESS NOTE ADULT - ASSESSMENT
58 year old English and Polish speaking F w/ AUD presented to 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 She was treated with ABX for possible UTI and SBP prophylaxis and  transferred to [facility] for further evaluation on 3/16. History obtained from pt and her daughter. Over the last 6 months has been drinking more alcohol at home (mostly vodka). Had been drinking ETOH before, after and while at work. Works as a ; last worked 12/2022. She lives with her  who also drinks (but reportedly drinks less that the pt). Pt has 2 children- one daughter who lives in [place] and a son in [place]. Pt went to [facility] ETOH rehab (group and individual therapy) starting in 01/2023 but has still been drinking despite therapy. Last drink was 3/9/23.      #AUD- MDF 94 (poor prognosis)  #c/f Budd-Chiari    Recs:  -please consult nutrition/PT/BH/SW   -infectious workup- bl cx x2, UA + Ucx, dx/tx para- f/u ascitic fluid analysis  -pending infectious workup will decide if pt is a candidate for steroids  -US abd + doppler  -upload images from [facility]  -daily CMP, CBC, INR  -c/w CTX for now for SBP ppx  -DVT ppx  -obtain TTE  -check Hep B surface Ag, Hep B core Ab, Hep C Ab + reflex RNA  -check serum MIKO, smooth muscle Ab, anti-LKM-1 Ab, alpha-1 antitrypsin, immunoglobulin panel       **THIS NOTE IS NOT FINALIZED UNTIL SIGNED BY THE ATTENDING**    Chely Randall MD  GI Fellow, PGY-5  Available via [employer] Teams    NON-URGENT CONSULTS:  Please email [email or web address] OR  [email or web address]  AT NIGHT AND ON WEEKENDS:  Contact on-call GI fellow via answering service ([phone]) from 5pm-8am and on weekends/holidays  MONDAY-FRIDAY 8AM-5PM:  Pager# [phone] ([facility]) or [phone] ([facility])  GI Phone# [phone] ([facility]) 58 year old English and Polish speaking F w/ AUD presented to 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 She was treated with ABX for possible UTI and SBP prophylaxis and  transferred to [facility] for further evaluation on 3/16. History obtained from pt and her daughter. Over the last 6 months has been drinking more alcohol at home (mostly vodka). Had been drinking ETOH before, after and while at work. Works as a ; last worked 12/2022. She lives with her  who also drinks (but reportedly drinks less that the pt). Pt has 2 children- one daughter who lives in [place] and a son in [place]. Pt went to OP ETOH rehab (group and individual therapy) starting in 01/2023 but has still been drinking despite therapy. Last drink was 3/9/23.      #AUD- MDF 94 (poor prognosis); UNOS/OPTN MELDNa 29 (3/17); ABO: pending  #c/f Budd-Chiari  -s/p para with 3.9 L removed, neg for SBP; SAAG > 1.1  -US venous duplex (3/16) BL LE neg for DVT.  -workup: (-)HAV IgM; (-)HBV sAg; (-)HBV cAb IgM; (-)HCV Ab    Recs:  -please consult nutrition/PT//SW   -infectious workup- bl cx x2, UA + Ucx, dx/tx para- f/u ascitic fluid analysis  -pending infectious workup will decide if pt is a candidate for steroids  -f/u US abd + doppler final read  -review uploaded images from [facility]  -check ABO blood type  -daily CMP, CBC, INR  -c/w CTX for now for SBP ppx (3/16 ->)  -c/w MVI/thiamine/folic acid  -DVT ppx  -obtain TTE  -check Hep B surface Ag, Hep B core Ab, Hep C Ab + reflex RNA  -check serum MIKO, smooth muscle Ab, anti-LKM-1 Ab, alpha-1 antitrypsin, immunoglobulin panel   -screen for EV when optimized  -start diuretics with lasix 20 mg PO daily + spironolactone 50 mg daily  -send GI PCR + C diff if patient having continued diarrhea  -will consult for LT eval; transfer to [facility] when bed is available      **THIS NOTE IS NOT FINALIZED UNTIL SIGNED BY THE ATTENDING**    Chely Randall MD  GI Fellow, PGY-5  Available via [employer] Teams    NON-URGENT CONSULTS:  Please email [email or web address]  AT NIGHT AND ON WEEKENDS:  Contact on-call GI fellow via answering service ([phone]) from 5pm-8am and on weekends/holidays  MONDAY-FRIDAY 8AM-5PM:  Pager# [phone] ([facility]) or [phone] ([facility])  GI Phone# [phone] ([facility])

## 2023-03-17 NOTE — PROGRESS NOTE ADULT - PROBLEM SELECTOR PLAN 4
lovenox 40 could be 2/2 to HF v some portal hypertension  Transthoracic EchocardiogramNormal left ventricular systolic function. No segmental  wall motion abnormalities. 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 Moderate mitral  regurgitation.  cards eval  b/l duplex neg for DVT  give lasix 40mg IV 3/16 and 3/17 will hold for soft SBP

## 2023-03-17 NOTE — PROGRESS NOTE ADULT - PROBLEM SELECTOR PLAN 2
- states last drink 2 weeks ago, at OSH for 3 days  - MV/Thiamine   - monitor mag and phos concern for Budd Chiari syndrome v alcoholic hepatitis at OSH so transferred to [facility] for further eval  hepatology consulted  ABD US ordered to eval w doppler pending  s/p IR diag/therapeutic paracenresis 3.8L removed low T protein already on empiric SBP tx, cell count not indicative of SBP but has been on abx since OSH  c/w Ceftriaxone  2gm daily, defer to hepatolgy if they want to tx empircally or switch to PPX dose   also concern for possible alcoholic hepatitis as well given hx of alcohol use-  holding off on steroids for now-  serologies pending  cultures pending

## 2023-03-17 NOTE — PROGRESS NOTE ADULT - PROBLEM SELECTOR PLAN 1
concern for Budd Chiari syndrome v alcoholic hepatitis at OSH so transferred to [facility] for further eval  hepatology consulted  ABD US ordered to eval w doppler pending  s/p IR diag/therapeutic paracenresis 3.8L removed low T protein already on empiric SBP tx, cell count not indicative of SBP but has been on abx since OSH  c/w Ceftriaxone  2gm daily, defer to hepatolgy if they want to tx empircally or switch to PPX dose   also concern for possible alcoholic hepatitis as well given hx of alcohol use-  holding off on steroids for now-  serologies pending  cultures pending 2/2 to pleural effusions ? hepatic hydrothorax, small could be 2.2, mod MR on echo normal LV systolic function  diureses prn  monitor ins and outs  keep net negative  wean 02 to maintain >92%, currently on 2.5L saturating well

## 2023-03-17 NOTE — CONSULT NOTE ADULT - SUBJECTIVE AND OBJECTIVE BOX
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     Patient reports last drink was about two weeks prior to admission , she reports no headache, no abdominal pain , no nausea or vomiting , no visual , tactile or auditory hallucinations. Patient reports increased lower extremity edema , as well as abdominal girth , she reports no prior history of ascites, she was started on spironolactone about two weeks prior to admission , with minimal relief.She also reports yellowing of skin and eyes during this time.  She reports no fever but does have chills. Patient also reports loose stool sometime with red blood , no melena no hematemesis.    (16 Mar 2023 05:05)      PAST MEDICAL & SURGICAL HISTORY:  Alcohol abuse      No significant past surgical history              MEDICATIONS:  enoxaparin Injectable 40 milliGRAM(s) SubCutaneous every 24 hours    cefTRIAXone   IVPB 2000 milliGRAM(s) IV Intermittent every 24 hours      melatonin 3 milliGRAM(s) Oral at bedtime PRN  ondansetron Injectable 4 milliGRAM(s) IV Push every 8 hours PRN        folic acid 1 milliGRAM(s) Oral daily  multivitamin 1 Tablet(s) Oral daily  thiamine 100 milliGRAM(s) Oral daily      FAMILY HISTORY:  No pertinent family history in first degree relatives        Non-contributory    SOCIAL HISTORY:    [ ] +ETOH    Allergies    No Known Allergies    Intolerances    	    REVIEW OF SYSTEMS:  CONSTITUTIONAL: No fever  EYES: No eye pain, visual disturbances, or discharge  ENMT:  No difficulty hearing, tinnitus  NECK: No pain or stiffness  RESPIRATORY: No cough, wheezing,  CARDIOVASCULAR: No chest pain, palpitations, passing out, dizziness, + leg swelling  GASTROINTESTINAL:  No nausea, vomiting, diarrhea or constipation. No melena. +Ascites  GENITOURINARY: No dysuria, hematuria  NEUROLOGICAL: No stroke like symptoms  SKIN: No burning or lesions   ENDOCRINE: No heat or cold intolerance  MUSCULOSKELETAL: No joint pain or swelling  PSYCHIATRIC: No  anxiety, mood swings  HEME/LYMPH: No bleeding gums  ALLERGY AND IMMUNOLOGIC: No hives or eczema	    All other ROS negative    PHYSICAL EXAM:  T(C): 36.7 (03-17-23 @ 12:10), Max: 36.7 (03-17-23 @ 04:23)  HR: 101 (03-17-23 @ 17:45) (95 - 104)  BP: 90/60 (03-17-23 @ 18:12) (90/60 - 108/64)  RR: 18 (03-17-23 @ 12:10) (18 - 18)  SpO2: 94% (03-17-23 @ 17:45) (92% - 95%)  Wt(kg): --  I&O's Summary    16 Mar 2023 07:01  -  17 Mar 2023 07:00  --------------------------------------------------------  IN: 480 mL / OUT: 900 mL / NET: -420 mL    17 Mar 2023 07:01  -  17 Mar 2023 21:18  --------------------------------------------------------  IN: 240 mL / OUT: 0 mL / NET: 240 mL        Appearance: Normal	  HEENT:   Normal oral mucosa, EOMI	  Cardiovascular:  S1 S2, No JVD,    Respiratory: Lungs clear to auscultation	  Psychiatry: Alert  Gastrointestinal:  Soft, Non-tender, + BS	  Skin: No rashes   Neurologic: Non-focal  Extremities:  No edema  Vascular: Peripheral pulses palpable    	    	  	  CARDIAC MARKERS:  Labs personally reviewed by me                                  11.4   13.89 )-----------( 165      ( 17 Mar 2023 07:13 )             33.2     03-17    135  |  97  |  5<L>  ----------------------------<  73  3.2<L>   |  26  |  0.42<L>    Ca    7.2<L>      17 Mar 2023 07:11    TPro  5.6<L>  /  Alb  1.8<L>  /  TBili  17.9<H>  /  DBili  x   /  AST  171<H>  /  ALT  58<H>  /  AlkPhos  154<H>  03-17        Radiology: Personally reviewed by me -   < from: Xray Chest 2 Views PA/Lat (03.16.23 @ 13:07) >  IMPRESSION: Small bilateral pleural effusions, right greater than left. Borderline cardiomegaly.    TTE Conclusions:  Normal left ventricular systolic function. No segmental wall motion abnormalities.  Possible bileallet mitral valve prolapse. Moderate mitral regurgitation.      Assessment and Plan:   · Assessment	  58F EtOH abuse,  transferred from [facility]  for acute liver failure     Problem/Plan - 1:  ·  Problem: Lower extremity edema.  ·  Plan: Likely 2/2 portal hypertension, less likely 2/2 HF  Echocardiogram. 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  ·  Plan: 2/2 to pleural effusions   Diuresis as noted above with PO Aldactone and Lasix  wean 02 to maintain >92%, currently on 2.5L saturating well.    Problem/Plan - 3:  ·  Problem: Need for prophylactic measure.   ·  Plan: lovenox 40.        Differential diagnosis and plan of care discussed with patient after the evaluation. Counseling on diet, nutritional counseling, weight management, exercise and medication compliance was done.   Advanced care planning/advanced directives discussed with patient/family. DNR status including forceful chest compressions to attempt to restart the heart, ventilator support/artificial breathing, electric shock, artificial nutrition, health care proxy, Molst form all discussed with pt. Pt wishes to consider. More than fifteen minutes spent on discussing advanced directives.           Jeffrey Melo DO [facility]  Cardiovascular Medicine  [address], [address]  Office [phone]  Available via call/text on Microsoft Teams

## 2023-03-18 LAB
A1C WITH ESTIMATED AVERAGE GLUCOSE RESULT: 4.7 % — SIGNIFICANT CHANGE UP (ref 4–5.6)
AFP-TM SERPL-MCNC: 2 NG/ML — SIGNIFICANT CHANGE UP
ALBUMIN SERPL ELPH-MCNC: 2.2 G/DL — LOW (ref 3.3–5)
ALBUMIN SERPL ELPH-MCNC: 2.2 G/DL — LOW (ref 3.3–5)
ALP SERPL-CCNC: 153 U/L — HIGH (ref 40–120)
ALP SERPL-CCNC: 153 U/L — HIGH (ref 40–120)
ALT FLD-CCNC: 55 U/L — HIGH (ref 10–45)
ALT FLD-CCNC: 56 U/L — HIGH (ref 10–45)
ANION GAP SERPL CALC-SCNC: 11 MMOL/L — SIGNIFICANT CHANGE UP (ref 5–17)
ANION GAP SERPL CALC-SCNC: 12 MMOL/L — SIGNIFICANT CHANGE UP (ref 5–17)
APPEARANCE UR: ABNORMAL
APTT BLD: 49.6 SEC — HIGH (ref 27.5–35.5)
AST SERPL-CCNC: 168 U/L — HIGH (ref 10–40)
AST SERPL-CCNC: 171 U/L — HIGH (ref 10–40)
BACTERIA # UR AUTO: NEGATIVE — SIGNIFICANT CHANGE UP
BILIRUB SERPL-MCNC: 18.8 MG/DL — HIGH (ref 0.2–1.2)
BILIRUB SERPL-MCNC: 19.2 MG/DL — HIGH (ref 0.2–1.2)
BILIRUB UR-MCNC: ABNORMAL
BLD GP AB SCN SERPL QL: NEGATIVE — SIGNIFICANT CHANGE UP
BUN SERPL-MCNC: 7 MG/DL — SIGNIFICANT CHANGE UP (ref 7–23)
BUN SERPL-MCNC: 7 MG/DL — SIGNIFICANT CHANGE UP (ref 7–23)
CALCIUM SERPL-MCNC: 7 MG/DL — LOW (ref 8.4–10.5)
CALCIUM SERPL-MCNC: 7 MG/DL — LOW (ref 8.4–10.5)
CERULOPLASMIN SERPL-MCNC: 17 MG/DL — SIGNIFICANT CHANGE UP (ref 16–45)
CHLORIDE SERPL-SCNC: 97 MMOL/L — SIGNIFICANT CHANGE UP (ref 96–108)
CHLORIDE SERPL-SCNC: 98 MMOL/L — SIGNIFICANT CHANGE UP (ref 96–108)
CHOLEST SERPL-MCNC: 72 MG/DL — SIGNIFICANT CHANGE UP
CMV IGG FLD QL: <0.2 U/ML — SIGNIFICANT CHANGE UP
CMV IGG SERPL-IMP: NEGATIVE — SIGNIFICANT CHANGE UP
CO2 SERPL-SCNC: 24 MMOL/L — SIGNIFICANT CHANGE UP (ref 22–31)
CO2 SERPL-SCNC: 25 MMOL/L — SIGNIFICANT CHANGE UP (ref 22–31)
COLOR SPEC: ABNORMAL
COVID-19 SPIKE DOMAIN AB INTERP: POSITIVE
COVID-19 SPIKE DOMAIN ANTIBODY RESULT: >250 U/ML — HIGH
CREAT SERPL-MCNC: 0.47 MG/DL — LOW (ref 0.5–1.3)
CREAT SERPL-MCNC: 0.48 MG/DL — LOW (ref 0.5–1.3)
DIFF PNL FLD: NEGATIVE — SIGNIFICANT CHANGE UP
EBV EA AB SER IA-ACNC: <5 U/ML — SIGNIFICANT CHANGE UP
EBV EA AB TITR SER IF: POSITIVE
EBV EA IGG SER-ACNC: NEGATIVE — SIGNIFICANT CHANGE UP
EBV NA IGG SER IA-ACNC: 573 U/ML — HIGH
EBV PATRN SPEC IB-IMP: SIGNIFICANT CHANGE UP
EBV VCA IGG AVIDITY SER QL IA: POSITIVE
EBV VCA IGM SER IA-ACNC: <10 U/ML — SIGNIFICANT CHANGE UP
EBV VCA IGM SER IA-ACNC: >750 U/ML — HIGH
EBV VCA IGM TITR FLD: NEGATIVE — SIGNIFICANT CHANGE UP
EGFR: 110 ML/MIN/1.73M2 — SIGNIFICANT CHANGE UP
EGFR: 111 ML/MIN/1.73M2 — SIGNIFICANT CHANGE UP
EPI CELLS # UR: 1 /HPF — SIGNIFICANT CHANGE UP
ESTIMATED AVERAGE GLUCOSE: 88 MG/DL — SIGNIFICANT CHANGE UP (ref 68–114)
FERRITIN SERPL-MCNC: 494 NG/ML — HIGH (ref 15–150)
GI PCR PANEL: SIGNIFICANT CHANGE UP
GLUCOSE SERPL-MCNC: 127 MG/DL — HIGH (ref 70–99)
GLUCOSE SERPL-MCNC: 97 MG/DL — SIGNIFICANT CHANGE UP (ref 70–99)
GLUCOSE UR QL: NEGATIVE — SIGNIFICANT CHANGE UP
HAV IGG SER QL IA: SIGNIFICANT CHANGE UP
HAV IGM SER-ACNC: SIGNIFICANT CHANGE UP
HBV CORE AB SER-ACNC: SIGNIFICANT CHANGE UP
HBV SURFACE AB SER-ACNC: <3 MIU/ML — LOW
HBV SURFACE AB SER-ACNC: SIGNIFICANT CHANGE UP
HBV SURFACE AG SER-ACNC: SIGNIFICANT CHANGE UP
HCG SERPL-ACNC: <2 MIU/ML — SIGNIFICANT CHANGE UP
HCT VFR BLD CALC: 33.4 % — LOW (ref 34.5–45)
HCT VFR BLD CALC: 34.6 % — SIGNIFICANT CHANGE UP (ref 34.5–45)
HCV AB S/CO SERPL IA: 0.22 S/CO — SIGNIFICANT CHANGE UP (ref 0–0.99)
HCV AB SERPL-IMP: SIGNIFICANT CHANGE UP
HCV RNA FLD QL NAA+PROBE: SIGNIFICANT CHANGE UP
HCV RNA SPEC QL PROBE+SIG AMP: SIGNIFICANT CHANGE UP
HDLC SERPL-MCNC: 6 MG/DL — LOW
HGB BLD-MCNC: 12 G/DL — SIGNIFICANT CHANGE UP (ref 11.5–15.5)
HGB BLD-MCNC: 12.3 G/DL — SIGNIFICANT CHANGE UP (ref 11.5–15.5)
HIV 1+2 AB+HIV1 P24 AG SERPL QL IA: SIGNIFICANT CHANGE UP
HSV1 IGG SER-ACNC: 60.4 INDEX — HIGH
HSV1 IGG SERPL QL IA: POSITIVE
HSV2 IGG FLD-ACNC: 0.06 INDEX — SIGNIFICANT CHANGE UP
HSV2 IGG SERPL QL IA: NEGATIVE — SIGNIFICANT CHANGE UP
HYALINE CASTS # UR AUTO: 2 /LPF — SIGNIFICANT CHANGE UP (ref 0–2)
IGA FLD-MCNC: 1070 MG/DL — HIGH (ref 84–499)
IGA FLD-MCNC: 1162 MG/DL — HIGH (ref 84–499)
IGG FLD-MCNC: 1865 MG/DL — HIGH (ref 610–1660)
IGG FLD-MCNC: 2029 MG/DL — HIGH (ref 610–1660)
IGM SERPL-MCNC: 126 MG/DL — SIGNIFICANT CHANGE UP (ref 35–242)
IGM SERPL-MCNC: 145 MG/DL — SIGNIFICANT CHANGE UP (ref 35–242)
INR BLD: 2.84 RATIO — HIGH (ref 0.88–1.16)
INR BLD: 2.85 RATIO — HIGH (ref 0.88–1.16)
IRON SATN MFR SERPL: 57 UG/DL — SIGNIFICANT CHANGE UP (ref 30–160)
IRON SATN MFR SERPL: 67 % — HIGH (ref 14–50)
KAPPA LC SER QL IFE: 6.04 MG/DL — HIGH (ref 0.33–1.94)
KAPPA/LAMBDA FREE LIGHT CHAIN RATIO, SERUM: 0.73 RATIO — SIGNIFICANT CHANGE UP (ref 0.26–1.65)
KETONES UR-MCNC: NEGATIVE — SIGNIFICANT CHANGE UP
LAMBDA LC SER QL IFE: 8.24 MG/DL — HIGH (ref 0.57–2.63)
LEUKOCYTE ESTERASE UR-ACNC: NEGATIVE — SIGNIFICANT CHANGE UP
LIPID PNL WITH DIRECT LDL SERPL: 43 MG/DL — SIGNIFICANT CHANGE UP
LKM AB SER-ACNC: <20.1 UNITS — SIGNIFICANT CHANGE UP (ref 0–20)
MAGNESIUM SERPL-MCNC: 1.7 MG/DL — SIGNIFICANT CHANGE UP (ref 1.6–2.6)
MAGNESIUM SERPL-MCNC: 1.8 MG/DL — SIGNIFICANT CHANGE UP (ref 1.6–2.6)
MCHC RBC-ENTMCNC: 35.5 GM/DL — SIGNIFICANT CHANGE UP (ref 32–36)
MCHC RBC-ENTMCNC: 35.9 GM/DL — SIGNIFICANT CHANGE UP (ref 32–36)
MCHC RBC-ENTMCNC: 37.4 PG — HIGH (ref 27–34)
MCHC RBC-ENTMCNC: 37.7 PG — HIGH (ref 27–34)
MCV RBC AUTO: 105 FL — HIGH (ref 80–100)
MCV RBC AUTO: 105.2 FL — HIGH (ref 80–100)
MEV IGG SER-ACNC: >300 AU/ML — SIGNIFICANT CHANGE UP
MEV IGG+IGM SER-IMP: POSITIVE — SIGNIFICANT CHANGE UP
MUV AB SER-ACNC: POSITIVE — SIGNIFICANT CHANGE UP
MUV IGG FLD-ACNC: 32.1 AU/ML — SIGNIFICANT CHANGE UP
NITRITE UR-MCNC: NEGATIVE — SIGNIFICANT CHANGE UP
NON HDL CHOLESTEROL: 66 MG/DL — SIGNIFICANT CHANGE UP
NRBC # BLD: 0 /100 WBCS — SIGNIFICANT CHANGE UP (ref 0–0)
PH UR: 7.5 — SIGNIFICANT CHANGE UP (ref 5–8)
PHOSPHATE SERPL-MCNC: 1.4 MG/DL — LOW (ref 2.5–4.5)
PHOSPHATE SERPL-MCNC: 1.6 MG/DL — LOW (ref 2.5–4.5)
PLATELET # BLD AUTO: 172 K/UL — SIGNIFICANT CHANGE UP (ref 150–400)
PLATELET # BLD AUTO: 179 K/UL — SIGNIFICANT CHANGE UP (ref 150–400)
POTASSIUM SERPL-MCNC: 3.7 MMOL/L — SIGNIFICANT CHANGE UP (ref 3.5–5.3)
POTASSIUM SERPL-MCNC: 3.8 MMOL/L — SIGNIFICANT CHANGE UP (ref 3.5–5.3)
POTASSIUM SERPL-SCNC: 3.7 MMOL/L — SIGNIFICANT CHANGE UP (ref 3.5–5.3)
POTASSIUM SERPL-SCNC: 3.8 MMOL/L — SIGNIFICANT CHANGE UP (ref 3.5–5.3)
PROT SERPL-MCNC: 5.5 G/DL — LOW (ref 6–8.3)
PROT SERPL-MCNC: 5.6 G/DL — LOW (ref 6–8.3)
PROT UR-MCNC: ABNORMAL
PROTHROM AB SERPL-ACNC: 33 SEC — HIGH (ref 10.5–13.4)
PROTHROM AB SERPL-ACNC: 33.4 SEC — HIGH (ref 10.5–13.4)
RBC # BLD: 3.18 M/UL — LOW (ref 3.8–5.2)
RBC # BLD: 3.29 M/UL — LOW (ref 3.8–5.2)
RBC # FLD: 16.6 % — HIGH (ref 10.3–14.5)
RBC # FLD: 16.8 % — HIGH (ref 10.3–14.5)
RBC CASTS # UR COMP ASSIST: 10 /HPF — HIGH (ref 0–4)
RH IG SCN BLD-IMP: NEGATIVE — SIGNIFICANT CHANGE UP
RUBV IGG SER-ACNC: 27.1 INDEX — SIGNIFICANT CHANGE UP
RUBV IGG SER-IMP: POSITIVE — SIGNIFICANT CHANGE UP
SARS-COV-2 IGG+IGM SERPL QL IA: >250 U/ML — HIGH
SARS-COV-2 IGG+IGM SERPL QL IA: POSITIVE
SODIUM SERPL-SCNC: 133 MMOL/L — LOW (ref 135–145)
SODIUM SERPL-SCNC: 134 MMOL/L — LOW (ref 135–145)
SP GR SPEC: 1.02 — SIGNIFICANT CHANGE UP (ref 1.01–1.02)
T GONDII IGG SER QL: <3 IU/ML — SIGNIFICANT CHANGE UP
T GONDII IGG SER QL: NEGATIVE — SIGNIFICANT CHANGE UP
T4 FREE SERPL-MCNC: 1.4 NG/DL — SIGNIFICANT CHANGE UP (ref 0.9–1.8)
TIBC SERPL-MCNC: 85 UG/DL — LOW (ref 220–430)
TRIGL SERPL-MCNC: 115 MG/DL — SIGNIFICANT CHANGE UP
TSH SERPL-MCNC: 2.73 UIU/ML — SIGNIFICANT CHANGE UP (ref 0.27–4.2)
UIBC SERPL-MCNC: 28 UG/DL — LOW (ref 110–370)
UROBILINOGEN FLD QL: NEGATIVE — SIGNIFICANT CHANGE UP
VZV IGG FLD QL IA: 786.1 INDEX — SIGNIFICANT CHANGE UP
VZV IGG FLD QL IA: POSITIVE — SIGNIFICANT CHANGE UP
WBC # BLD: 15.3 K/UL — HIGH (ref 3.8–10.5)
WBC # BLD: 18 K/UL — HIGH (ref 3.8–10.5)
WBC # FLD AUTO: 15.3 K/UL — HIGH (ref 3.8–10.5)
WBC # FLD AUTO: 18 K/UL — HIGH (ref 3.8–10.5)
WBC UR QL: 1 /HPF — SIGNIFICANT CHANGE UP (ref 0–5)

## 2023-03-18 PROCEDURE — 99233 SBSQ HOSP IP/OBS HIGH 50: CPT | Mod: GC

## 2023-03-18 PROCEDURE — 71045 X-RAY EXAM CHEST 1 VIEW: CPT | Mod: 26

## 2023-03-18 PROCEDURE — 99232 SBSQ HOSP IP/OBS MODERATE 35: CPT

## 2023-03-18 RX ORDER — PIPERACILLIN AND TAZOBACTAM 4; .5 G/20ML; G/20ML
3.38 INJECTION, POWDER, LYOPHILIZED, FOR SOLUTION INTRAVENOUS EVERY 8 HOURS
Refills: 0 | Status: DISCONTINUED | OUTPATIENT
Start: 2023-03-19 | End: 2023-03-24

## 2023-03-18 RX ORDER — ACETAMINOPHEN 500 MG
650 TABLET ORAL ONCE
Refills: 0 | Status: COMPLETED | OUTPATIENT
Start: 2023-03-18 | End: 2023-03-18

## 2023-03-18 RX ORDER — PIPERACILLIN AND TAZOBACTAM 4; .5 G/20ML; G/20ML
3.38 INJECTION, POWDER, LYOPHILIZED, FOR SOLUTION INTRAVENOUS ONCE
Refills: 0 | Status: COMPLETED | OUTPATIENT
Start: 2023-03-19 | End: 2023-03-19

## 2023-03-18 RX ORDER — PIPERACILLIN AND TAZOBACTAM 4; .5 G/20ML; G/20ML
3.38 INJECTION, POWDER, LYOPHILIZED, FOR SOLUTION INTRAVENOUS ONCE
Refills: 0 | Status: COMPLETED | OUTPATIENT
Start: 2023-03-18 | End: 2023-03-18

## 2023-03-18 RX ADMIN — Medication 100 MILLIGRAM(S): at 11:39

## 2023-03-18 RX ADMIN — Medication 1 TABLET(S): at 11:39

## 2023-03-18 RX ADMIN — ENOXAPARIN SODIUM 40 MILLIGRAM(S): 100 INJECTION SUBCUTANEOUS at 18:11

## 2023-03-18 RX ADMIN — CEFTRIAXONE 100 MILLIGRAM(S): 500 INJECTION, POWDER, FOR SOLUTION INTRAMUSCULAR; INTRAVENOUS at 07:24

## 2023-03-18 RX ADMIN — PIPERACILLIN AND TAZOBACTAM 200 GRAM(S): 4; .5 INJECTION, POWDER, LYOPHILIZED, FOR SOLUTION INTRAVENOUS at 23:27

## 2023-03-18 RX ADMIN — Medication 63.75 MILLIMOLE(S): at 11:39

## 2023-03-18 RX ADMIN — SPIRONOLACTONE 50 MILLIGRAM(S): 25 TABLET, FILM COATED ORAL at 08:22

## 2023-03-18 RX ADMIN — Medication 20 MILLIGRAM(S): at 08:22

## 2023-03-18 RX ADMIN — Medication 1 MILLIGRAM(S): at 11:40

## 2023-03-18 RX ADMIN — Medication 650 MILLIGRAM(S): at 16:37

## 2023-03-18 RX ADMIN — Medication 650 MILLIGRAM(S): at 16:07

## 2023-03-18 NOTE — PROVIDER CONTACT NOTE (OTHER) - ACTION/TREATMENT ORDERED:
provider to come and assess vitals at bedside. provider to come and assess vitals at bedside. provider took BP electronically- 91/56 BP. no new orders at this time.

## 2023-03-18 NOTE — PROGRESS NOTE ADULT - SUBJECTIVE AND OBJECTIVE BOX
Chief Complaint:  Patient is a 58y old  Female who presents with a chief complaint of liver failure (16 Mar 2023 15:56)      Interval Events:   -Liver evaluation consent signed yesterday.  -US done yesterday w/ no evidence of Budd Chiari malformation.   -Awaiting bed on 6 [facility]      MEDICATIONS  (STANDING):  cefTRIAXone   IVPB 2000 milliGRAM(s) IV Intermittent every 24 hours  enoxaparin Injectable 40 milliGRAM(s) SubCutaneous every 24 hours  folic acid 1 milliGRAM(s) Oral daily  furosemide    Tablet 20 milliGRAM(s) Oral daily  multivitamin 1 Tablet(s) Oral daily  spironolactone 50 milliGRAM(s) Oral daily  thiamine 100 milliGRAM(s) Oral daily    MEDICATIONS  (PRN):  melatonin 3 milliGRAM(s) Oral at bedtime PRN Insomnia  ondansetron Injectable 4 milliGRAM(s) IV Push every 8 hours PRN Nausea and/or Vomiting      PAST MEDICAL & SURGICAL HISTORY:  Alcohol abuse      No significant past surgical history          Vital Signs Last 24 Hrs  T(C): 36.9 (17 Mar 2023 19:57), Max: 36.9 (17 Mar 2023 19:57)  T(F): 98.5 (17 Mar 2023 19:57), Max: 98.5 (17 Mar 2023 19:57)  HR: 101 (17 Mar 2023 19:57) (95 - 104)  BP: 100/68 (17 Mar 2023 19:57) (90/60 - 108/64)  BP(mean): --  RR: 18 (17 Mar 2023 19:57) (18 - 18)  SpO2: 93% (17 Mar 2023 19:57) (92% - 95%)    Parameters below as of 17 Mar 2023 19:57  Patient On (Oxygen Delivery Method): nasal cannula  O2 Flow (L/min): 2.5      I&O's Summary    16 Mar 2023 07:01  -  17 Mar 2023 07:00  --------------------------------------------------------  IN: 480 mL / OUT: 900 mL / NET: -420 mL    17 Mar 2023 07:01  -  18 Mar 2023 01:28  --------------------------------------------------------  IN: 240 mL / OUT: 0 mL / NET: 240 mL                              11.4   13.89 )-----------( 165      ( 17 Mar 2023 07:13 )             33.2     03-17    135  |  97  |  5<L>  ----------------------------<  73  3.2<L>   |  26  |  0.42<L>    Ca    7.2<L>      17 Mar 2023 07:11    TPro  5.6<L>  /  Alb  1.8<L>  /  TBili  17.9<H>  /  DBili  x   /  AST  171<H>  /  ALT  58<H>  /  AlkPhos  154<H>  03-17          Culture - Body Fluid with Gram Stain (collected 03-17-23 @ 10:49)  Source: Abdominal Fl Abdominal Fluid  Gram Stain (03-17-23 @ 18:48):    polymorphonuclear leukocytes seen    No organisms seen    by cytocentrifuge            ROS: 14 point ROS negative unless otherwise stated in subjective      PHYSICAL EXAM:     GENERAL:  Appears stated age, well-groomed, +chronically ill appearing  HEENT:  NC/AT, +scleral icterus  CHEST:  Full & symmetric excursion, +bibasilar crackles; +NC  HEART:  Regular rhythm, S1, S2, no murmur/rub/S3/S4  ABDOMEN:  Soft, non-tender, +mildly-distended  EXTREMITIES: 3+ BL edema  SKIN:  +jaundice  NEURO:  Alert, orientedx3, no asterixis

## 2023-03-18 NOTE — PROVIDER CONTACT NOTE (OTHER) - BACKGROUND
pt admitted for ascites and acute liver failure. pt BP has been dropping at times during admission to 90s systolic.

## 2023-03-18 NOTE — PROGRESS NOTE ADULT - SUBJECTIVE AND OBJECTIVE BOX
Jose Alberto Dowd MD  Division of Hospital Medicine  Available via MS teams  If no response or off hours page: [phone]  ---------------------------------------------------------    GAYLEPIPER GIBSON  58y  Female      Patient is a 58y old  Female who presents with a chief complaint of liver failure (18 Mar 2023 10:24)      INTERVAL HPI/OVERNIGHT EVENTS:  Seen at bedside. Has some slight epigastric discomfort 2/2 US done yesterday      REVIEW OF SYSTEMS: 10 point ROS negative unless listed above    T(C): 36.8 (03-18-23 @ 04:37), Max: 36.9 (03-17-23 @ 19:57)  HR: 91 (03-18-23 @ 04:37) (91 - 104)  BP: 100/63 (03-18-23 @ 08:14) (90/60 - 108/64)  RR: 18 (03-18-23 @ 04:37) (18 - 18)  SpO2: 93% (03-18-23 @ 04:37) (92% - 94%)  Wt(kg): --Vital Signs Last 24 Hrs  T(C): 36.8 (18 Mar 2023 04:37), Max: 36.9 (17 Mar 2023 19:57)  T(F): 98.3 (18 Mar 2023 04:37), Max: 98.5 (17 Mar 2023 19:57)  HR: 91 (18 Mar 2023 04:37) (91 - 104)  BP: 100/63 (18 Mar 2023 08:14) (90/60 - 108/64)  BP(mean): --  RR: 18 (18 Mar 2023 04:37) (18 - 18)  SpO2: 93% (18 Mar 2023 04:37) (92% - 94%)    Parameters below as of 18 Mar 2023 04:37  Patient On (Oxygen Delivery Method): nasal cannula  O2 Flow (L/min): 2.5      PHYSICAL EXAM:  GENERAL: No acute distress, well-developed, mild tremors/ asterixis   NECK: supple , no JVD   CHEST/LUNG: Clear to auscultation bilaterally; No wheeze, equal breath sounds bilaterally   BACK: No spinal tenderness,  No CVA tenderness   HEART: Regular rate and rhythm; No murmurs, rubs, or gallops  ABDOMEN: mod distended , not tense ,  Nontender + shifting dullness ; Bowel sounds present  EXTREMITIES:  No clubbing, cyanosis, +3 pitting edema b/l   PSYCH: Normal behavior/affect  NEUROLOGY: AAOx3, non-focal, cranial nerves intact  SKIN: + jaundice , No rashes or open lesions    Consultant(s) Notes Reviewed:  [x ] YES  [ ] NO  Care Discussed with Consultants/Other Providers [ x] YES  [ ] NO    LABS:                        12.0   15.30 )-----------( 179      ( 18 Mar 2023 08:36 )             33.4     03-18    134<L>  |  98  |  7   ----------------------------<  97  3.8   |  25  |  0.47<L>    Ca    7.0<L>      18 Mar 2023 07:20  Phos  1.6     03-18  Mg     1.8     03-18    TPro  5.5<L>  /  Alb  2.2<L>  /  TBili  18.8<H>  /  DBili  x   /  AST  171<H>  /  ALT  55<H>  /  AlkPhos  153<H>  03-18    PT/INR - ( 18 Mar 2023 07:20 )   PT: 33.4 sec;   INR: 2.85 ratio         PTT - ( 17 Mar 2023 07:16 )  PTT:47.1 sec    CAPILLARY BLOOD GLUCOSE                RADIOLOGY & ADDITIONAL TESTS:    Imaging Personally Reviewed:  [ ] YES  [ ] NO

## 2023-03-18 NOTE — PROVIDER CONTACT NOTE (OTHER) - ASSESSMENT
pt a&ox4. BP 82/56 . pt states she has no dizziness lightheadedness or blurry vision. pt resting comfortably in bed.

## 2023-03-18 NOTE — PROGRESS NOTE ADULT - SUBJECTIVE AND OBJECTIVE BOX
DATE OF SERVICE: 03-18-23 @ 10:24    Patient is a 58y old  Female who presents with a chief complaint of liver failure (18 Mar 2023 01:26)      INTERVAL HISTORY: no complaints     REVIEW OF SYSTEMS:  CONSTITUTIONAL: No weakness  EYES/ENT: No visual changes;  No throat pain   NECK: No pain or stiffness  RESPIRATORY: No cough, wheezing; No shortness of breath  CARDIOVASCULAR: No chest pain or palpitations  GASTROINTESTINAL: No abdominal  pain. No nausea, vomiting, or hematemesis  GENITOURINARY: No dysuria, frequency or hematuria  NEUROLOGICAL: No stroke like symptoms  SKIN: No rashes    	  MEDICATIONS:  furosemide    Tablet 20 milliGRAM(s) Oral daily  spironolactone 50 milliGRAM(s) Oral daily        PHYSICAL EXAM:  T(C): 36.8 (03-18-23 @ 04:37), Max: 36.9 (03-17-23 @ 19:57)  HR: 91 (03-18-23 @ 04:37) (91 - 104)  BP: 100/63 (03-18-23 @ 08:14) (90/60 - 108/64)  RR: 18 (03-18-23 @ 04:37) (18 - 18)  SpO2: 93% (03-18-23 @ 04:37) (92% - 94%)  Wt(kg): --  I&O's Summary    17 Mar 2023 07:01  -  18 Mar 2023 07:00  --------------------------------------------------------  IN: 240 mL / OUT: 0 mL / NET: 240 mL          Appearance: In no distress	  HEENT:    PERRL, EOMI	  Cardiovascular:  S1 S2, No JVD  Respiratory: Lungs clear to auscultation	  Gastrointestinal:  Soft, Non-tender, + BS	  Vascularature:  No edema of LE  Psychiatric: Appropriate affect   Neuro: no acute focal deficits                               12.0   15.30 )-----------( 179      ( 18 Mar 2023 08:36 )             33.4     03-18    134<L>  |  98  |  7   ----------------------------<  97  3.8   |  25  |  0.47<L>    Ca    7.0<L>      18 Mar 2023 07:20  Phos  1.6     03-18  Mg     1.8     03-18    TPro  5.5<L>  /  Alb  2.2<L>  /  TBili  18.8<H>  /  DBili  x   /  AST  171<H>  /  ALT  55<H>  /  AlkPhos  153<H>  03-18        Labs personally reviewed      ASSESSMENT/PLAN: 	  58F EtOH abuse,  transferred from [facility]  for acute liver failure. Polish  ID [id number] used.     Problem/Plan - 1:  ·  Problem: Lower extremity edema.  ·  Plan: Likely 2/2 portal hypertension, less likely 2/2 HF  Echocardiogram. 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 C/w Aldactone 50 mg and Lasix 20mg    Problem/Plan - 2:  ·  Problem: Acute hypoxemic respiratory failure.  ·  Plan: 2/2 to pleural effusions   Diuresis as noted above with PO Aldactone and Lasix  wean 02 to maintain >92%, currently on 2.5L saturating well.    Problem/Plan - 3:  ·  Problem: Need for prophylactic measure.   ·  Plan: lovenox 40.          SHAI Falcon DO [facility]  Cardiovascular Medicine  [address] [place] [address], [address]  Office: [phone]  Available via call/text on Microsoft Teams

## 2023-03-18 NOTE — PROVIDER CONTACT NOTE (OTHER) - SITUATION
patient BP 82/56 . BP was taken manually on both arms twice. pt states she has no dizziness or blurry vision.

## 2023-03-18 NOTE — PROGRESS NOTE ADULT - PROBLEM SELECTOR PLAN 1
2/2 to pleural effusions ? hepatic hydrothorax, small could be 2.2, mod MR on echo normal LV systolic function  c/w Lasix and Spironolactone  monitor ins and outs  keep net negative  wean 02 to maintain >92%, currently on 2.5L saturating well

## 2023-03-18 NOTE — PROGRESS NOTE ADULT - PROBLEM SELECTOR PLAN 2
concern initially for Budd Chiari syndrome v alcoholic hepatitis at OSH so transferred to [facility] for further eval  hepatology consulted  ABD US negative for budd chiari  s/p IR diag/therapeutic paracenresis 3.8L removed low T protein already on empiric SBP tx, cell count not indicative of SBP but has been on abx since OSH  c/w Ceftriaxone  2gm daily, defer to hepatology if they want to tx empircally or switch to PPX dose   also concern for possible alcoholic hepatitis as well given hx of alcohol use-  holding off on steroids for now-  serologies pending  cultures pending  Pending bed on [facility]

## 2023-03-18 NOTE — PROGRESS NOTE ADULT - PROBLEM SELECTOR PLAN 4
could be 2/2 to  portal hypertension  Transthoracic Echocardiogram Normal left ventricular systolic function. No segmental wall motion abnormalities. Possible bileaflet mitral valve prolapse. Moderate mitral  regurgitation.  cardiology following  b/l duplex neg for DVT  c/w Lasix and Spironolactone

## 2023-03-18 NOTE — CHART NOTE - NSCHARTNOTEFT_GEN_A_CORE
Fevers    Received a call from hepatology Dr Colleen Talbert to repeat blood cultures now, order UA and chest xray. D/c ceftriaxone and start Zosyn    Vital Signs Last 24 Hrs  T(C): 37.8 (18 Mar 2023 17:08), Max: 38.3 (18 Mar 2023 15:55)  T(F): 100.1 (18 Mar 2023 17:08), Max: 101 (18 Mar 2023 15:55)  HR: 100 (18 Mar 2023 17:08) (91 - 118)  BP: 113/62 (18 Mar 2023 15:38) (94/56 - 113/62)  BP(mean): --  RR: 18 (18 Mar 2023 15:38) (18 - 18)  SpO2: 97% (18 Mar 2023 15:38) (93% - 97%)    Parameters below as of 18 Mar 2023 15:38  Patient On (Oxygen Delivery Method): nasal cannula  O2 Flow (L/min): 2.5    Will endorse to primary day team, attending to follow    Shannan Burgos NP  [facility] [id number]

## 2023-03-19 DIAGNOSIS — R65.10 SYSTEMIC INFLAMMATORY RESPONSE SYNDROME (SIRS) OF NON-INFECTIOUS ORIGIN WITHOUT ACUTE ORGAN DYSFUNCTION: ICD-10-CM

## 2023-03-19 LAB
ALBUMIN SERPL ELPH-MCNC: 2.1 G/DL — LOW (ref 3.3–5)
ALP SERPL-CCNC: 138 U/L — HIGH (ref 40–120)
ALT FLD-CCNC: 54 U/L — HIGH (ref 10–45)
ANION GAP SERPL CALC-SCNC: 15 MMOL/L — SIGNIFICANT CHANGE UP (ref 5–17)
APPEARANCE UR: ABNORMAL
APTT BLD: 65.5 SEC — HIGH (ref 27.5–35.5)
AST SERPL-CCNC: 185 U/L — HIGH (ref 10–40)
BASE EXCESS BLDV CALC-SCNC: 1.3 MMOL/L — SIGNIFICANT CHANGE UP (ref -2–3)
BILIRUB SERPL-MCNC: 18 MG/DL — HIGH (ref 0.2–1.2)
BILIRUB UR-MCNC: ABNORMAL
BUN SERPL-MCNC: 10 MG/DL — SIGNIFICANT CHANGE UP (ref 7–23)
CA-I SERPL-SCNC: 1.02 MMOL/L — LOW (ref 1.15–1.33)
CALCIUM SERPL-MCNC: 7 MG/DL — LOW (ref 8.4–10.5)
CHLORIDE BLDV-SCNC: 97 MMOL/L — SIGNIFICANT CHANGE UP (ref 96–108)
CHLORIDE SERPL-SCNC: 97 MMOL/L — SIGNIFICANT CHANGE UP (ref 96–108)
CO2 BLDV-SCNC: 27 MMOL/L — HIGH (ref 22–26)
CO2 SERPL-SCNC: 21 MMOL/L — LOW (ref 22–31)
COLOR SPEC: ABNORMAL
CREAT SERPL-MCNC: 0.98 MG/DL — SIGNIFICANT CHANGE UP (ref 0.5–1.3)
DIFF PNL FLD: ABNORMAL
EGFR: 67 ML/MIN/1.73M2 — SIGNIFICANT CHANGE UP
GAS PNL BLDV: 129 MMOL/L — LOW (ref 136–145)
GAS PNL BLDV: SIGNIFICANT CHANGE UP
GLUCOSE BLDV-MCNC: 106 MG/DL — HIGH (ref 70–99)
GLUCOSE SERPL-MCNC: 108 MG/DL — HIGH (ref 70–99)
GLUCOSE UR QL: NEGATIVE — SIGNIFICANT CHANGE UP
HCO3 BLDV-SCNC: 26 MMOL/L — SIGNIFICANT CHANGE UP (ref 22–29)
HCT VFR BLD CALC: 33.4 % — LOW (ref 34.5–45)
HCT VFR BLDA CALC: 40 % — SIGNIFICANT CHANGE UP (ref 34.5–46.5)
HGB BLD CALC-MCNC: 13.2 G/DL — SIGNIFICANT CHANGE UP (ref 11.7–16.1)
HGB BLD-MCNC: 11.4 G/DL — LOW (ref 11.5–15.5)
INR BLD: 3.94 RATIO — HIGH (ref 0.88–1.16)
KETONES UR-MCNC: NEGATIVE — SIGNIFICANT CHANGE UP
LACTATE BLDV-MCNC: 3.4 MMOL/L — HIGH (ref 0.5–2)
LACTATE SERPL-SCNC: 4.3 MMOL/L — CRITICAL HIGH (ref 0.5–2)
LACTATE SERPL-SCNC: 7.4 MMOL/L — CRITICAL HIGH (ref 0.5–2)
LEUKOCYTE ESTERASE UR-ACNC: NEGATIVE — SIGNIFICANT CHANGE UP
MCHC RBC-ENTMCNC: 34.1 GM/DL — SIGNIFICANT CHANGE UP (ref 32–36)
MCHC RBC-ENTMCNC: 36.5 PG — HIGH (ref 27–34)
MCV RBC AUTO: 107.1 FL — HIGH (ref 80–100)
MITOCHONDRIA AB SER-ACNC: SIGNIFICANT CHANGE UP
NITRITE UR-MCNC: NEGATIVE — SIGNIFICANT CHANGE UP
NRBC # BLD: 0 /100 WBCS — SIGNIFICANT CHANGE UP (ref 0–0)
PCO2 BLDV: 40 MMHG — SIGNIFICANT CHANGE UP (ref 39–42)
PH BLDV: 7.42 — SIGNIFICANT CHANGE UP (ref 7.32–7.43)
PH UR: 6 — SIGNIFICANT CHANGE UP (ref 5–8)
PHOSPHATE SERPL-MCNC: 2.4 MG/DL — LOW (ref 2.5–4.5)
PLATELET # BLD AUTO: 150 K/UL — SIGNIFICANT CHANGE UP (ref 150–400)
PO2 BLDV: 20 MMHG — LOW (ref 25–45)
POTASSIUM BLDV-SCNC: 3.9 MMOL/L — SIGNIFICANT CHANGE UP (ref 3.5–5.1)
POTASSIUM SERPL-MCNC: 3.6 MMOL/L — SIGNIFICANT CHANGE UP (ref 3.5–5.3)
POTASSIUM SERPL-SCNC: 3.6 MMOL/L — SIGNIFICANT CHANGE UP (ref 3.5–5.3)
PROT SERPL-MCNC: 5.4 G/DL — LOW (ref 6–8.3)
PROT UR-MCNC: ABNORMAL
PROTHROM AB SERPL-ACNC: 46.3 SEC — HIGH (ref 10.5–13.4)
RBC # BLD: 3.12 M/UL — LOW (ref 3.8–5.2)
RBC # FLD: 17.8 % — HIGH (ref 10.3–14.5)
SAO2 % BLDV: 32.5 % — LOW (ref 67–88)
SMOOTH MUSCLE AB SER-ACNC: SIGNIFICANT CHANGE UP
SMOOTH MUSCLE AB SER-ACNC: SIGNIFICANT CHANGE UP
SODIUM SERPL-SCNC: 133 MMOL/L — LOW (ref 135–145)
SP GR SPEC: 1.01 — SIGNIFICANT CHANGE UP (ref 1.01–1.02)
UROBILINOGEN FLD QL: NEGATIVE — SIGNIFICANT CHANGE UP
WBC # BLD: 21.07 K/UL — HIGH (ref 3.8–10.5)
WBC # FLD AUTO: 21.07 K/UL — HIGH (ref 3.8–10.5)

## 2023-03-19 PROCEDURE — 71045 X-RAY EXAM CHEST 1 VIEW: CPT | Mod: 26

## 2023-03-19 PROCEDURE — 99233 SBSQ HOSP IP/OBS HIGH 50: CPT

## 2023-03-19 PROCEDURE — 99232 SBSQ HOSP IP/OBS MODERATE 35: CPT | Mod: GC

## 2023-03-19 RX ORDER — ALBUMIN HUMAN 25 %
50 VIAL (ML) INTRAVENOUS ONCE
Refills: 0 | Status: COMPLETED | OUTPATIENT
Start: 2023-03-19 | End: 2023-03-19

## 2023-03-19 RX ORDER — ALBUMIN HUMAN 25 %
100 VIAL (ML) INTRAVENOUS EVERY 8 HOURS
Refills: 0 | Status: COMPLETED | OUTPATIENT
Start: 2023-03-19 | End: 2023-03-22

## 2023-03-19 RX ORDER — SODIUM CHLORIDE 9 MG/ML
500 INJECTION INTRAMUSCULAR; INTRAVENOUS; SUBCUTANEOUS ONCE
Refills: 0 | Status: COMPLETED | OUTPATIENT
Start: 2023-03-19 | End: 2023-03-19

## 2023-03-19 RX ADMIN — Medication 1 TABLET(S): at 11:16

## 2023-03-19 RX ADMIN — SODIUM CHLORIDE 166.67 MILLILITER(S): 9 INJECTION INTRAMUSCULAR; INTRAVENOUS; SUBCUTANEOUS at 02:46

## 2023-03-19 RX ADMIN — SPIRONOLACTONE 50 MILLIGRAM(S): 25 TABLET, FILM COATED ORAL at 06:30

## 2023-03-19 RX ADMIN — Medication 1 MILLIGRAM(S): at 11:17

## 2023-03-19 RX ADMIN — Medication 50 MILLILITER(S): at 01:54

## 2023-03-19 RX ADMIN — Medication 50 MILLILITER(S): at 21:42

## 2023-03-19 RX ADMIN — ENOXAPARIN SODIUM 40 MILLIGRAM(S): 100 INJECTION SUBCUTANEOUS at 18:07

## 2023-03-19 RX ADMIN — PIPERACILLIN AND TAZOBACTAM 25 GRAM(S): 4; .5 INJECTION, POWDER, LYOPHILIZED, FOR SOLUTION INTRAVENOUS at 03:14

## 2023-03-19 RX ADMIN — Medication 50 MILLILITER(S): at 15:33

## 2023-03-19 RX ADMIN — PIPERACILLIN AND TAZOBACTAM 25 GRAM(S): 4; .5 INJECTION, POWDER, LYOPHILIZED, FOR SOLUTION INTRAVENOUS at 11:17

## 2023-03-19 RX ADMIN — PIPERACILLIN AND TAZOBACTAM 25 GRAM(S): 4; .5 INJECTION, POWDER, LYOPHILIZED, FOR SOLUTION INTRAVENOUS at 18:06

## 2023-03-19 NOTE — PROGRESS NOTE ADULT - PROBLEM SELECTOR PLAN 1
Increasing WBC with fever yesterday. Currently no localizing symptoms other then mild abdominal pain. No cough, diarrhea, urinary symptoms skin lesions. UA negative, RVP negative, paracentesis negative for SBP. Prior duplex negative for DVT, GI pcr negative. If infection ruled out fever and SIRS could be 2/2 alcoholic hepatitis  - Broadened to Zosyn  - Bcx sent, f/u results  - Transplant ID eval Increasing WBC with fever yesterday. Currently no localizing symptoms other then mild abdominal pain. No cough, diarrhea, urinary symptoms skin lesions. UA negative, RVP negative, paracentesis negative for SBP. Prior duplex negative for DVT, GI pcr negative. CXR with possible basilar infiltrate. If infection ruled out fever and SIRS could be 2/2 alcoholic hepatitis  - Broadened to Zosyn  - Bcx sent, f/u results  - Transplant ID eval

## 2023-03-19 NOTE — CHART NOTE - NSCHARTNOTEFT_GEN_A_CORE
CC Lactate 4.3    Medicine Progress Note  58y old  Female who presented with a chief complaint of liver failure (18 Mar 2023 11:16), noted with lactate 4.3      SUBJECTIVE / OVERNIGHT EVENTS: fever    ADDITIONAL REVIEW OF SYSTEMS: ETOH abuse    MEDICATIONS  (STANDING):  enoxaparin Injectable 40 milliGRAM(s) SubCutaneous every 24 hours  folic acid 1 milliGRAM(s) Oral daily  multivitamin 1 Tablet(s) Oral daily  piperacillin/tazobactam IVPB.. 3.375 Gram(s) IV Intermittent every 8 hours    MEDICATIONS  (PRN):  melatonin 3 milliGRAM(s) Oral at bedtime PRN Insomnia  ondansetron Injectable 4 milliGRAM(s) IV Push every 8 hours PRN Nausea and/or Vomiting    CAPILLARY BLOOD GLUCOSE        I&O's Summary    18 Mar 2023 07:01  -  19 Mar 2023 07:00  --------------------------------------------------------  IN: 240 mL / OUT: 0 mL / NET: 240 mL        PHYSICAL EXAM:  Vital Signs Last 24 Hrs  T(C): 36.6 (19 Mar 2023 05:12), Max: 38.3 (18 Mar 2023 15:55)  T(F): 97.8 (19 Mar 2023 05:12), Max: 101 (18 Mar 2023 15:55)  HR: 93 (19 Mar 2023 05:12) (93 - 118)  BP: 102/65 (19 Mar 2023 05:12) (82/56 - 113/62)  BP(mean): --  RR: 18 (19 Mar 2023 05:12) (18 - 18)  SpO2: 95% (19 Mar 2023 05:12) (92% - 97%)    Parameters below as of 19 Mar 2023 05:12  Patient On (Oxygen Delivery Method): nasal cannula  O2 Flow (L/min): 2.5    CONSTITUTIONAL:   ENMT:   RESPIRATORY:   CARDIOVASCULAR:   ABDOMEN:   PSYCH:   NEUROLOGY:   SKIN:     LABS:                        11.4   21.07 )-----------( 150      ( 19 Mar 2023 07:18 )             33.4     03-19    133<L>  |  97  |  10  ----------------------------<  108<H>  3.6   |  21<L>  |  0.98    Ca    7.0<L>      19 Mar 2023 07:12  Phos  2.4     03-  Mg     1.7     -18    TPro  5.4<L>  /  Alb  2.1<L>  /  TBili  18.0<H>  /  DBili  x   /  AST  185<H>  /  ALT  54<H>  /  AlkPhos  138<H>  03-19    PT/INR - ( 19 Mar 2023 07:20 )   PT: 46.3 sec;   INR: 3.94 ratio         PTT - ( 19 Mar 2023 07:20 )  PTT:65.5 sec      Urinalysis Basic - ( 19 Mar 2023 06:59 )    Color: Dark Yellow / Appearance: Slightly Turbid / S.014 / pH: x  Gluc: x / Ketone: Negative  / Bili: Large >10 / Urobili: Negative   Blood: x / Protein: Trace / Nitrite: Negative   Leuk Esterase: Negative / RBC: 2 /hpf / WBC 11 /HPF   Sq Epi: x / Non Sq Epi: 3 /hpf / Bacteria: Negative        Culture - Blood (collected 17 Mar 2023 13:45)  Source: .Blood Blood  Preliminary Report (18 Mar 2023 18:02):    No growth to date.    Culture - Blood (collected 17 Mar 2023 13:45)  Source: .Blood Blood  Preliminary Report (18 Mar 2023 18:02):    No growth to date.    Culture - Fungal, Body Fluid (collected 17 Mar 2023 10:49)  Source: Peritoneal Peritoneal Fluid  Preliminary Report (18 Mar 2023 07:49):    Testing in progress    Culture - Body Fluid with Gram Stain (collected 17 Mar 2023 10:49)  Source: Abdominal Fl Abdominal Fluid  Gram Stain (17 Mar 2023 18:48):    polymorphonuclear leukocytes seen    No organisms seen    by cytocentrifuge  Preliminary Report (18 Mar 2023 12:12):    No growth      COVID-19 PCR: NotDetec (16 Mar 2023 16:11)      RADIOLOGY & ADDITIONAL TESTS:  Imaging from Last 24 Hours:    Electrocardiogram/QTc Interval:    COORDINATION OF CARE:  Care Discussed with Consultants/Other Providers: CC Lactate 4.3    Medicine Progress Note  58y old  Female who presented with a chief complaint of liver failure (18 Mar 2023 11:16), noted with lactate 4.3      SUBJECTIVE / OVERNIGHT EVENTS: fever    ADDITIONAL REVIEW OF SYSTEMS: ETOH abuse    MEDICATIONS  (STANDING):  enoxaparin Injectable 40 milliGRAM(s) SubCutaneous every 24 hours  folic acid 1 milliGRAM(s) Oral daily  multivitamin 1 Tablet(s) Oral daily  piperacillin/tazobactam IVPB.. 3.375 Gram(s) IV Intermittent every 8 hours    MEDICATIONS  (PRN):  melatonin 3 milliGRAM(s) Oral at bedtime PRN Insomnia  ondansetron Injectable 4 milliGRAM(s) IV Push every 8 hours PRN Nausea and/or Vomiting    CAPILLARY BLOOD GLUCOSE        I&O's Summary    18 Mar 2023 07:01  -  19 Mar 2023 07:00  --------------------------------------------------------  IN: 240 mL / OUT: 0 mL / NET: 240 mL        PHYSICAL EXAM:  Vital Signs Last 24 Hrs  T(C): 36.6 (19 Mar 2023 05:12), Max: 38.3 (18 Mar 2023 15:55)  T(F): 97.8 (19 Mar 2023 05:12), Max: 101 (18 Mar 2023 15:55)  HR: 93 (19 Mar 2023 05:12) (93 - 118)  BP: 102/65 (19 Mar 2023 05:12) (82/56 - 113/62)  BP(mean): --  RR: 18 (19 Mar 2023 05:12) (18 - 18)  SpO2: 95% (19 Mar 2023 05:12) (92% - 97%)    Parameters below as of 19 Mar 2023 05:12  Patient On (Oxygen Delivery Method): nasal cannula  O2 Flow (L/min): 2.5    CONSTITUTIONAL:   ENMT:   RESPIRATORY:   CARDIOVASCULAR:   ABDOMEN:   PSYCH:   NEUROLOGY:   SKIN:     LABS:                        11.4   21.07 )-----------( 150      ( 19 Mar 2023 07:18 )             33.4     03-19    133<L>  |  97  |  10  ----------------------------<  108<H>  3.6   |  21<L>  |  0.98    Ca    7.0<L>      19 Mar 2023 07:12  Phos  2.4     -  Mg     1.7         TPro  5.4<L>  /  Alb  2.1<L>  /  TBili  18.0<H>  /  DBili  x   /  AST  185<H>  /  ALT  54<H>  /  AlkPhos  138<H>      PT/INR - ( 19 Mar 2023 07:20 )   PT: 46.3 sec;   INR: 3.94 ratio         PTT - ( 19 Mar 2023 07:20 )  PTT:65.5 sec      Urinalysis Basic - ( 19 Mar 2023 06:59 )    Color: Dark Yellow / Appearance: Slightly Turbid / S.014 / pH: x  Gluc: x / Ketone: Negative  / Bili: Large >10 / Urobili: Negative   Blood: x / Protein: Trace / Nitrite: Negative   Leuk Esterase: Negative / RBC: 2 /hpf / WBC 11 /HPF   Sq Epi: x / Non Sq Epi: 3 /hpf / Bacteria: Negative        Culture - Blood (collected 17 Mar 2023 13:45)  Source: .Blood Blood  Preliminary Report (18 Mar 2023 18:02):    No growth to date.    Culture - Blood (collected 17 Mar 2023 13:45)  Source: .Blood Blood  Preliminary Report (18 Mar 2023 18:02):    No growth to date.    Culture - Fungal, Body Fluid (collected 17 Mar 2023 10:49)  Source: Peritoneal Peritoneal Fluid  Preliminary Report (18 Mar 2023 07:49):    Testing in progress    Culture - Body Fluid with Gram Stain (collected 17 Mar 2023 10:49)  Source: Abdominal Fl Abdominal Fluid  Gram Stain (17 Mar 2023 18:48):    polymorphonuclear leukocytes seen    No organisms seen    by cytocentrifuge  Preliminary Report (18 Mar 2023 12:12):    No growth      COVID-19 PCR: NotDetec (16 Mar 2023 16:11)    A/P  58 year old female w/ Hx  of EtOH abuse transferred from [facility] for acute liver failure, EOH abuse, Ascites with improving lactate level 7.4> 4.3.    #	Acute liver failure  	Alcohol abuse, last drink 2 weeks ago  	Ascites, 3/17 s/p paracentesis - 3860cc removed>zosyn, lactate improved 7.4> 4.3> continue to trend. Hepatology following

## 2023-03-19 NOTE — PROGRESS NOTE ADULT - PROBLEM SELECTOR PLAN 3
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

## 2023-03-19 NOTE — PROGRESS NOTE ADULT - PROBLEM SELECTOR PLAN 2
2/2 to pleural effusions ? hepatic hydrothorax, small could be 2.2, mod MR on echo normal LV systolic function  - Holding Lasix and Spironolactone given hypotension overnight  - monitor ins and outs  - wean 02 to maintain >92%, currently on 2.5L saturating well

## 2023-03-19 NOTE — PROGRESS NOTE ADULT - ASSESSMENT
58 year old English and Polish speaking F w/ AUD presented to [facility] on 3/13/23 for 2 weeks of worsening generalized weakness in setting of lower extremity edema yellowing of the eyes, and ascites and was found to have elevated liver enzymes as well as CT findings concerning for Budd-chiarri. She was treated with ABX for possible UTI and SBP prophylaxis and  transferred to [facility] for further evaluation on 3/16. History obtained from pt and her daughter. Over the last 6 months has been drinking more alcohol at home (mostly vodka). Had been drinking ETOH before, after and while at work. Works as a ; last worked 12/2022. She lives with her  who also drinks (but reportedly drinks less that the pt). Pt has 2 children- one daughter who lives in [place] and a son in [place]. Pt went to [facility] ETOH rehab (group and individual therapy) starting in 01/2023 but has still been drinking despite therapy. Last drink was 3/9/23.      #AUD- MDF 94 (poor prognosis); UNOS/OPTN MELDNa 30 (3/19); ABO: pending. Eval started 3/17  #c/f Budd-Castroari. US neg.   -s/p para with 3.9 L removed, neg for SBP; SAAG > 1.1.   -US venous duplex (3/16) BL LE neg for DVT.  -workup: (-)HAV IgM; (-)HBV sAg; (-)HBV cAb IgM; (-)HCV Ab, MIKO neg, Alpha 1 antitrypsin neg,   - Blood type: O negative.   - Infectious work up: CXR showed pleural effusions, UA neg.   - TTE obtained on 3/16 - showed bileaflet mitral valve prolapse with moderate MR, EF 60-65%.     #Possible sepsis  Has persistent fevers with leukocytosis, no infectious source identified.   - blood Cx from 3/17 neg, will repeat it.   - SBP neg.   - Repeat CXR no pneumonia.   - UA neg.   - Broadened abx to Zosyn at this time.     Recs:  - continue with Zosyn for now.   - Discontinue diuretics at this time as her blood pressures are low.   - Start her on Albumin 100 cc Q8 hours.   - Consider repeating paracentesis if she has ascites as she has persistent fevers.   - Please urine Na tomorrow morning.   - PETH levels pending.   - please consult transplant ID and cardiology for LT evaluation.   -please consult nutrition/PT/BH/SW   -daily CMP, CBC, INR  -c/w MVI/thiamine/folic acid  -DVT ppx  - pending smooth muscle Ab, anti-LKM-1 Ab, and immunoglobulin panel   - will eventually need EGD for EV screening.   - Replete Mg and PO4 levels, please obtain them daily.   - Recommend transferring to [address] [place] [address] for LT eval.     Transplant hepatology will continue to follow.     All recommendations are tentative until note is attested by an attending.     Colleen Talbert, PGY-4  Gastroenterology/Hepatology Fellow  Available on Microsoft Teams  [phone] (Short Range Pager)  [phone] (Long Range Pager)    After 5pm, please contact the on-call GI fellow. [phone]

## 2023-03-19 NOTE — PROGRESS NOTE ADULT - SUBJECTIVE AND OBJECTIVE BOX
Jose Alberto Dowd MD  Division of Hospital Medicine  Available via MS teams  If no response or off hours page: [phone]  ---------------------------------------------------------    GAYLEPIPER GIBSON  58y  Female      Patient is a 58y old  Female who presents with a chief complaint of liver failure (19 Mar 2023 11:43)      INTERVAL HPI/OVERNIGHT EVENTS:  Seen at bedside. Has some mild abdominal pain. Was febrile yesterday, currently denies any infectious symptoms. No cough, dysuria, diarrhea, pain at IV sites      REVIEW OF SYSTEMS: 10 point ROS negative unless listed above    T(C): 36.3 (23 @ 12:20), Max: 38.3 (23 @ 15:55)  HR: 100 (23 @ 12:20) (93 - 118)  BP: 92/58 (23 @ 12:20) (82/56 - 113/62)  RR: 18 (23 @ 12:20) (18 - 18)  SpO2: 94% (23 @ 12:20) (92% - 97%)  Wt(kg): --Vital Signs Last 24 Hrs  T(C): 36.3 (19 Mar 2023 12:20), Max: 38.3 (18 Mar 2023 15:55)  T(F): 97.4 (19 Mar 2023 12:20), Max: 101 (18 Mar 2023 15:55)  HR: 100 (19 Mar 2023 12:20) (93 - 118)  BP: 92/58 (19 Mar 2023 12:20) (82/56 - 113/62)  BP(mean): --  RR: 18 (19 Mar 2023 12:20) (18 - 18)  SpO2: 94% (19 Mar 2023 12:20) (92% - 97%)    Parameters below as of 19 Mar 2023 12:20  Patient On (Oxygen Delivery Method): nasal cannula  O2 Flow (L/min): 2.5      PHYSICAL EXAM:  GENERAL: No acute distress, well-developed, mild tremors/ asterixis   NECK: supple , no JVD   CHEST/LUNG: Clear to auscultation bilaterally; No wheeze, equal breath sounds bilaterally   BACK: No spinal tenderness,  No CVA tenderness   HEART: Regular rate and rhythm; No murmurs, rubs, or gallops  ABDOMEN: mod distended , not tense ,  slight tenderness in epigastric region  EXTREMITIES:  No clubbing, cyanosis, +3 pitting edema b/l   PSYCH: Normal behavior/affect  NEUROLOGY: AAOx3, non-focal, cranial nerves intact  SKIN: + jaundice , No rashes or open lesions    Consultant(s) Notes Reviewed:  [x ] YES  [ ] NO  Care Discussed with Consultants/Other Providers [ x] YES  [ ] NO    LABS:                        11.4   21.07 )-----------( 150      ( 19 Mar 2023 07:18 )             33.4     03-19    133<L>  |  97  |  10  ----------------------------<  108<H>  3.6   |  21<L>  |  0.98    Ca    7.0<L>      19 Mar 2023 07:12  Phos  2.4     03-19  Mg     1.7     03-18    TPro  5.4<L>  /  Alb  2.1<L>  /  TBili  18.0<H>  /  DBili  x   /  AST  185<H>  /  ALT  54<H>  /  AlkPhos  138<H>  03-19    PT/INR - ( 19 Mar 2023 07:20 )   PT: 46.3 sec;   INR: 3.94 ratio         PTT - ( 19 Mar 2023 07:20 )  PTT:65.5 sec  Urinalysis Basic - ( 19 Mar 2023 06:59 )    Color: Dark Yellow / Appearance: Slightly Turbid / S.014 / pH: x  Gluc: x / Ketone: Negative  / Bili: Large >10 / Urobili: Negative   Blood: x / Protein: Trace / Nitrite: Negative   Leuk Esterase: Negative / RBC: 2 /hpf / WBC 11 /HPF   Sq Epi: x / Non Sq Epi: 3 /hpf / Bacteria: Negative      CAPILLARY BLOOD GLUCOSE            Urinalysis Basic - ( 19 Mar 2023 06:59 )    Color: Dark Yellow / Appearance: Slightly Turbid / S.014 / pH: x  Gluc: x / Ketone: Negative  / Bili: Large >10 / Urobili: Negative   Blood: x / Protein: Trace / Nitrite: Negative   Leuk Esterase: Negative / RBC: 2 /hpf / WBC 11 /HPF   Sq Epi: x / Non Sq Epi: 3 /hpf / Bacteria: Negative        RADIOLOGY & ADDITIONAL TESTS:    Imaging Personally Reviewed:  [ ] YES  [ ] NO

## 2023-03-19 NOTE — PROGRESS NOTE ADULT - SUBJECTIVE AND OBJECTIVE BOX
DATE OF SERVICE: 03-19-23 @ 11:05    Patient is a 58y old  Female who presents with a chief complaint of liver failure (18 Mar 2023 11:16)      INTERVAL HISTORY: no complaints     REVIEW OF SYSTEMS:  CONSTITUTIONAL: No weakness  EYES/ENT: No visual changes;  No throat pain   NECK: No pain or stiffness  RESPIRATORY: No cough, wheezing; No shortness of breath  CARDIOVASCULAR: No chest pain or palpitations  GASTROINTESTINAL: No abdominal  pain. No nausea, vomiting, or hematemesis  GENITOURINARY: No dysuria, frequency or hematuria  NEUROLOGICAL: No stroke like symptoms  SKIN: No rashes    	  MEDICATIONS:        PHYSICAL EXAM:  T(C): 36.6 (03-19-23 @ 05:12), Max: 38.3 (03-18-23 @ 15:55)  HR: 93 (03-19-23 @ 05:12) (93 - 118)  BP: 102/65 (03-19-23 @ 05:12) (82/56 - 113/62)  RR: 18 (03-19-23 @ 05:12) (18 - 18)  SpO2: 95% (03-19-23 @ 05:12) (92% - 97%)  Wt(kg): --  I&O's Summary    18 Mar 2023 07:01  -  19 Mar 2023 07:00  --------------------------------------------------------  IN: 240 mL / OUT: 0 mL / NET: 240 mL          Appearance: In no distress	  HEENT:    PERRL, EOMI	  Cardiovascular:  S1 S2, No JVD  Respiratory: Lungs clear to auscultation	  Gastrointestinal:  Soft, Non-tender, + BS	  Vascularature:  No edema of LE  Psychiatric: Appropriate affect   Neuro: no acute focal deficits                               11.4   21.07 )-----------( 150      ( 19 Mar 2023 07:18 )             33.4     03-19    133<L>  |  97  |  10  ----------------------------<  108<H>  3.6   |  21<L>  |  0.98    Ca    7.0<L>      19 Mar 2023 07:12  Phos  2.4     03-19  Mg     1.7     03-18    TPro  5.4<L>  /  Alb  2.1<L>  /  TBili  18.0<H>  /  DBili  x   /  AST  185<H>  /  ALT  54<H>  /  AlkPhos  138<H>  03-19        Labs personally reviewed      ASSESSMENT/PLAN: 	    58F EtOH abuse,  transferred from [facility]  for acute liver failure.     Problem/Plan - 1:  ·  Problem: Lower extremity edema.  ·  Plan: Likely 2/2 portal hypertension, less likely 2/2 HF  Echocardiogram. 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 C/w Aldactone 50 mg and Lasix 20mg    Problem/Plan - 2:  ·  Problem: Acute hypoxemic respiratory failure.  ·  Plan: 2/2 to pleural effusions   Diuresis as noted above with PO Aldactone and Lasix  wean 02 to maintain >92%, currently on 2.5L saturating well.    Problem/Plan - 3:  ·  Problem: Need for prophylactic measure.   ·  Plan: lovenox 40.          SHAI Falcon DO [facility]  Cardiovascular Medicine  [address] [place] [address], [address]  Office: [phone]  Available via call/text on Microsoft Teams

## 2023-03-19 NOTE — PROGRESS NOTE ADULT - SUBJECTIVE AND OBJECTIVE BOX
Gastroenterology/Hepatology Progress Note      Interval Events:     Patient complained of fatigue and mild diffuse abd pain which is stable. No nausea or vomiting, tolerating diet well.   - had fever last night, with elevated lactate and worsening leukocytosis, no infectious source at this time. Broadened abx to Zosyn.   - Patient was mildly hypotensive and responded to IV albumin.       Allergies:  No Known Allergies      Hospital Medications:  enoxaparin Injectable 40 milliGRAM(s) SubCutaneous every 24 hours  folic acid 1 milliGRAM(s) Oral daily  melatonin 3 milliGRAM(s) Oral at bedtime PRN  multivitamin 1 Tablet(s) Oral daily  ondansetron Injectable 4 milliGRAM(s) IV Push every 8 hours PRN  piperacillin/tazobactam IVPB.. 3.375 Gram(s) IV Intermittent every 8 hours      ROS: 14 point ROS negative unless otherwise state in subjective    PHYSICAL EXAM:   Vital Signs:  Vital Signs Last 24 Hrs  T(C): 36.6 (19 Mar 2023 05:12), Max: 38.3 (18 Mar 2023 15:55)  T(F): 97.8 (19 Mar 2023 05:12), Max: 101 (18 Mar 2023 15:55)  HR: 93 (19 Mar 2023 05:12) (93 - 118)  BP: 102/65 (19 Mar 2023 05:12) (82/56 - 113/62)  BP(mean): --  RR: 18 (19 Mar 2023 05:12) (18 - 18)  SpO2: 95% (19 Mar 2023 05:12) (92% - 97%)    Parameters below as of 19 Mar 2023 05:12  Patient On (Oxygen Delivery Method): nasal cannula  O2 Flow (L/min): 2.5    Daily     Daily     PHYSICAL EXAM:     GENERAL:  Appears stated age, well-groomed, +chronically ill appearing  HEENT:  NC/AT, +scleral icterus  CHEST:  Full & symmetric excursion, +bibasilar crackles; +NC  HEART:  Regular rhythm, S1, S2, no murmur/rub/S3/S4  ABDOMEN:  Soft, non-tender, +mildly-distended  EXTREMITIES: 2+ BL edema  SKIN:  +jaundice  NEURO:  Alert, orientedx3, no asterixis    LABS:                        11.4   21.07 )-----------( 150      ( 19 Mar 2023 07:18 )             33.4     Mean Cell Volume: 107.1 fl (23 @ 07:18)        133<L>  |  97  |  10  ----------------------------<  108<H>  3.6   |  21<L>  |  0.98    Ca    7.0<L>      19 Mar 2023 07:12  Phos  2.4       Mg     1.7         TPro  5.4<L>  /  Alb  2.1<L>  /  TBili  18.0<H>  /  DBili  x   /  AST  185<H>  /  ALT  54<H>  /  AlkPhos  138<H>      LIVER FUNCTIONS - ( 19 Mar 2023 07:12 )  Alb: 2.1 g/dL / Pro: 5.4 g/dL / ALK PHOS: 138 U/L / ALT: 54 U/L / AST: 185 U/L / GGT: x           PT/INR - ( 19 Mar 2023 07:20 )   PT: 46.3 sec;   INR: 3.94 ratio         PTT - ( 19 Mar 2023 07:20 )  PTT:65.5 sec  Urinalysis Basic - ( 19 Mar 2023 06:59 )    Color: Dark Yellow / Appearance: Slightly Turbid / S.014 / pH: x  Gluc: x / Ketone: Negative  / Bili: Large >10 / Urobili: Negative   Blood: x / Protein: Trace / Nitrite: Negative   Leuk Esterase: Negative / RBC: 2 /hpf / WBC 11 /HPF   Sq Epi: x / Non Sq Epi: 3 /hpf / Bacteria: Negative            Imaging: Reviewed.

## 2023-03-20 DIAGNOSIS — K74.60 UNSPECIFIED CIRRHOSIS OF LIVER: ICD-10-CM

## 2023-03-20 LAB
ALBUMIN SERPL ELPH-MCNC: 2.8 G/DL — LOW (ref 3.3–5)
ALP SERPL-CCNC: 115 U/L — SIGNIFICANT CHANGE UP (ref 40–120)
ALT FLD-CCNC: 49 U/L — HIGH (ref 10–45)
ANA PAT FLD IF-IMP: ABNORMAL
ANA TITR SER: ABNORMAL
ANION GAP SERPL CALC-SCNC: 12 MMOL/L — SIGNIFICANT CHANGE UP (ref 5–17)
ANISOCYTOSIS BLD QL: SLIGHT — SIGNIFICANT CHANGE UP
AST SERPL-CCNC: 148 U/L — HIGH (ref 10–40)
BASOPHILS # BLD AUTO: 0 K/UL — SIGNIFICANT CHANGE UP (ref 0–0.2)
BASOPHILS NFR BLD AUTO: 0 % — SIGNIFICANT CHANGE UP (ref 0–2)
BILIRUB SERPL-MCNC: 19.1 MG/DL — HIGH (ref 0.2–1.2)
BUN SERPL-MCNC: 10 MG/DL — SIGNIFICANT CHANGE UP (ref 7–23)
BURR CELLS BLD QL SMEAR: PRESENT — SIGNIFICANT CHANGE UP
CALCIUM SERPL-MCNC: 7.2 MG/DL — LOW (ref 8.4–10.5)
CHLORIDE SERPL-SCNC: 97 MMOL/L — SIGNIFICANT CHANGE UP (ref 96–108)
CMV DNA CSF QL NAA+PROBE: SIGNIFICANT CHANGE UP
CMV DNA SPEC NAA+PROBE-LOG#: SIGNIFICANT CHANGE UP LOG10IU/ML
CO2 SERPL-SCNC: 24 MMOL/L — SIGNIFICANT CHANGE UP (ref 22–31)
CREAT SERPL-MCNC: 0.58 MG/DL — SIGNIFICANT CHANGE UP (ref 0.5–1.3)
EBV DNA SERPL NAA+PROBE-ACNC: SIGNIFICANT CHANGE UP IU/ML
EBVPCR LOG: SIGNIFICANT CHANGE UP LOG10IU/ML
EGFR: 105 ML/MIN/1.73M2 — SIGNIFICANT CHANGE UP
EOSINOPHIL # BLD AUTO: 0 K/UL — SIGNIFICANT CHANGE UP (ref 0–0.5)
EOSINOPHIL NFR BLD AUTO: 0 % — SIGNIFICANT CHANGE UP (ref 0–6)
GIANT PLATELETS BLD QL SMEAR: PRESENT — SIGNIFICANT CHANGE UP
GLUCOSE SERPL-MCNC: 93 MG/DL — SIGNIFICANT CHANGE UP (ref 70–99)
HBV DNA # SERPL NAA+PROBE: SIGNIFICANT CHANGE UP
HBV DNA SERPL NAA+PROBE-LOG#: SIGNIFICANT CHANGE UP LOGIU/ML
HCT VFR BLD CALC: 29.8 % — LOW (ref 34.5–45)
HGB BLD-MCNC: 10.1 G/DL — LOW (ref 11.5–15.5)
INR BLD: 3.09 RATIO — HIGH (ref 0.88–1.16)
LACTATE SERPL-SCNC: 2.7 MMOL/L — HIGH (ref 0.5–2)
LYMPHOCYTES # BLD AUTO: 0.16 K/UL — LOW (ref 1–3.3)
LYMPHOCYTES # BLD AUTO: 0.9 % — LOW (ref 13–44)
MACROCYTES BLD QL: SLIGHT — SIGNIFICANT CHANGE UP
MAGNESIUM SERPL-MCNC: 1.9 MG/DL — SIGNIFICANT CHANGE UP (ref 1.6–2.6)
MANUAL SMEAR VERIFICATION: SIGNIFICANT CHANGE UP
MCHC RBC-ENTMCNC: 33.9 GM/DL — SIGNIFICANT CHANGE UP (ref 32–36)
MCHC RBC-ENTMCNC: 35.7 PG — HIGH (ref 27–34)
MCV RBC AUTO: 105.3 FL — HIGH (ref 80–100)
MONOCYTES # BLD AUTO: 1.08 K/UL — HIGH (ref 0–0.9)
MONOCYTES NFR BLD AUTO: 6.1 % — SIGNIFICANT CHANGE UP (ref 2–14)
NEUTROPHILS # BLD AUTO: 16.44 K/UL — HIGH (ref 1.8–7.4)
NEUTROPHILS NFR BLD AUTO: 93 % — HIGH (ref 43–77)
PHOSPHATE SERPL-MCNC: 1.2 MG/DL — LOW (ref 2.5–4.5)
PLAT MORPH BLD: NORMAL — SIGNIFICANT CHANGE UP
PLATELET # BLD AUTO: 128 K/UL — LOW (ref 150–400)
POIKILOCYTOSIS BLD QL AUTO: SIGNIFICANT CHANGE UP
POLYCHROMASIA BLD QL SMEAR: SLIGHT — SIGNIFICANT CHANGE UP
POTASSIUM SERPL-MCNC: 3.1 MMOL/L — LOW (ref 3.5–5.3)
POTASSIUM SERPL-SCNC: 3.1 MMOL/L — LOW (ref 3.5–5.3)
PROT SERPL-MCNC: 5.6 G/DL — LOW (ref 6–8.3)
PROTHROM AB SERPL-ACNC: 35.9 SEC — HIGH (ref 10.5–13.4)
RBC # BLD: 2.83 M/UL — LOW (ref 3.8–5.2)
RBC # FLD: 16.9 % — HIGH (ref 10.3–14.5)
RBC BLD AUTO: ABNORMAL
SCHISTOCYTES BLD QL AUTO: SLIGHT — SIGNIFICANT CHANGE UP
SODIUM SERPL-SCNC: 133 MMOL/L — LOW (ref 135–145)
SODIUM UR-SCNC: 107 MMOL/L — SIGNIFICANT CHANGE UP
T PALLIDUM AB TITR SER: NEGATIVE — SIGNIFICANT CHANGE UP
TARGETS BLD QL SMEAR: SIGNIFICANT CHANGE UP
WBC # BLD: 17.68 K/UL — HIGH (ref 3.8–10.5)
WBC # FLD AUTO: 17.68 K/UL — HIGH (ref 3.8–10.5)

## 2023-03-20 PROCEDURE — 99223 1ST HOSP IP/OBS HIGH 75: CPT

## 2023-03-20 PROCEDURE — 99233 SBSQ HOSP IP/OBS HIGH 50: CPT | Mod: GC

## 2023-03-20 PROCEDURE — 99233 SBSQ HOSP IP/OBS HIGH 50: CPT

## 2023-03-20 RX ORDER — THIAMINE MONONITRATE (VIT B1) 100 MG
100 TABLET ORAL DAILY
Refills: 0 | Status: DISCONTINUED | OUTPATIENT
Start: 2023-03-20 | End: 2023-03-24

## 2023-03-20 RX ORDER — FUROSEMIDE 40 MG
40 TABLET ORAL ONCE
Refills: 0 | Status: COMPLETED | OUTPATIENT
Start: 2023-03-20 | End: 2023-03-20

## 2023-03-20 RX ORDER — MAGNESIUM SULFATE 500 MG/ML
2 VIAL (ML) INJECTION ONCE
Refills: 0 | Status: COMPLETED | OUTPATIENT
Start: 2023-03-20 | End: 2023-03-20

## 2023-03-20 RX ORDER — POTASSIUM CHLORIDE 20 MEQ
40 PACKET (EA) ORAL EVERY 4 HOURS
Refills: 0 | Status: COMPLETED | OUTPATIENT
Start: 2023-03-20 | End: 2023-03-20

## 2023-03-20 RX ADMIN — Medication 25 GRAM(S): at 11:06

## 2023-03-20 RX ADMIN — Medication 50 MILLILITER(S): at 21:51

## 2023-03-20 RX ADMIN — PIPERACILLIN AND TAZOBACTAM 25 GRAM(S): 4; .5 INJECTION, POWDER, LYOPHILIZED, FOR SOLUTION INTRAVENOUS at 18:22

## 2023-03-20 RX ADMIN — Medication 50 MILLILITER(S): at 05:02

## 2023-03-20 RX ADMIN — Medication 40 MILLIEQUIVALENT(S): at 12:04

## 2023-03-20 RX ADMIN — Medication 1 TABLET(S): at 12:03

## 2023-03-20 RX ADMIN — PIPERACILLIN AND TAZOBACTAM 25 GRAM(S): 4; .5 INJECTION, POWDER, LYOPHILIZED, FOR SOLUTION INTRAVENOUS at 02:09

## 2023-03-20 RX ADMIN — Medication 1 MILLIGRAM(S): at 12:03

## 2023-03-20 RX ADMIN — Medication 40 MILLIGRAM(S): at 15:39

## 2023-03-20 RX ADMIN — PIPERACILLIN AND TAZOBACTAM 25 GRAM(S): 4; .5 INJECTION, POWDER, LYOPHILIZED, FOR SOLUTION INTRAVENOUS at 12:07

## 2023-03-20 RX ADMIN — Medication 50 MILLILITER(S): at 15:30

## 2023-03-20 RX ADMIN — ENOXAPARIN SODIUM 40 MILLIGRAM(S): 100 INJECTION SUBCUTANEOUS at 18:21

## 2023-03-20 RX ADMIN — Medication 85 MILLIMOLE(S): at 12:02

## 2023-03-20 RX ADMIN — Medication 40 MILLIEQUIVALENT(S): at 15:31

## 2023-03-20 NOTE — PROGRESS NOTE ADULT - SUBJECTIVE AND OBJECTIVE BOX
Chief Complaint:  Patient is a 58y old  Female who presents with a chief complaint of liver failure (19 Mar 2023 12:23)      Interval Events: Reports feeling well. Denies any N/V/D/C, abd pain, melena or hematochezia.      Hospital Medications:  albumin human 25% IVPB 100 milliLiter(s) IV Intermittent every 8 hours  enoxaparin Injectable 40 milliGRAM(s) SubCutaneous every 24 hours  folic acid 1 milliGRAM(s) Oral daily  melatonin 3 milliGRAM(s) Oral at bedtime PRN  multivitamin 1 Tablet(s) Oral daily  ondansetron Injectable 4 milliGRAM(s) IV Push every 8 hours PRN  piperacillin/tazobactam IVPB.. 3.375 Gram(s) IV Intermittent every 8 hours      PMHX/PSHX:  Alcohol abuse    No significant past surgical history            ROS: 14 point ROS negative unless otherwise stated in subjective      PHYSICAL EXAM:     GENERAL:  Appears stated age, well-groomed, +chronically ill appearing  HEENT:  NC/AT, +scleral icterus  CHEST:  Full & symmetric excursion, +bibasilar crackles; +NC  HEART:  Regular rhythm, S1, S2, no murmur/rub/S3/S4  ABDOMEN:  Soft, non-tender, +mildly-distended  EXTREMITIES: 2+ BL edema  SKIN:  +jaundice  NEURO:  Alert, orientedx3, no asterixis    Vital Signs:  Vital Signs Last 24 Hrs  T(C): 37.1 (20 Mar 2023 04:31), Max: 37.2 (19 Mar 2023 20:39)  T(F): 98.8 (20 Mar 2023 04:31), Max: 98.9 (19 Mar 2023 20:39)  HR: 97 (20 Mar 2023 04:31) (97 - 109)  BP: 100/61 (20 Mar 2023 04:31) (92/58 - 100/61)  BP(mean): --  RR: 18 (20 Mar 2023 04:31) (18 - 18)  SpO2: 91% (20 Mar 2023 04:31) (91% - 95%)    Parameters below as of 20 Mar 2023 04:31  Patient On (Oxygen Delivery Method): nasal cannula  O2 Flow (L/min): 2.5    Daily     Daily     LABS:                        11.4   21.07 )-----------( 150      ( 19 Mar 2023 07:18 )             33.4     03-19    133<L>  |  97  |  10  ----------------------------<  108<H>  3.6   |  21<L>  |  0.98    Ca    7.0<L>      19 Mar 2023 07:12  Phos  2.4     03-19  Mg     1.7     -18    TPro  5.4<L>  /  Alb  2.1<L>  /  TBili  18.0<H>  /  DBili  x   /  AST  185<H>  /  ALT  54<H>  /  AlkPhos  138<H>  -19    LIVER FUNCTIONS - ( 19 Mar 2023 07:12 )  Alb: 2.1 g/dL / Pro: 5.4 g/dL / ALK PHOS: 138 U/L / ALT: 54 U/L / AST: 185 U/L / GGT: x           PT/INR - ( 19 Mar 2023 07:20 )   PT: 46.3 sec;   INR: 3.94 ratio         PTT - ( 19 Mar 2023 07:20 )  PTT:65.5 sec  Urinalysis Basic - ( 19 Mar 2023 06:59 )    Color: Dark Yellow / Appearance: Slightly Turbid / S.014 / pH: x  Gluc: x / Ketone: Negative  / Bili: Large >10 / Urobili: Negative   Blood: x / Protein: Trace / Nitrite: Negative   Leuk Esterase: Negative / RBC: 2 /hpf / WBC 11 /HPF   Sq Epi: x / Non Sq Epi: 3 /hpf / Bacteria: Negative          Imaging:             Chief Complaint:  Patient is a 58y old  Female who presents with a chief complaint of liver failure (19 Mar 2023 12:23)      Interval Events:   Reports she feels so-so.       Hospital Medications:  albumin human 25% IVPB 100 milliLiter(s) IV Intermittent every 8 hours  enoxaparin Injectable 40 milliGRAM(s) SubCutaneous every 24 hours  folic acid 1 milliGRAM(s) Oral daily  melatonin 3 milliGRAM(s) Oral at bedtime PRN  multivitamin 1 Tablet(s) Oral daily  ondansetron Injectable 4 milliGRAM(s) IV Push every 8 hours PRN  piperacillin/tazobactam IVPB.. 3.375 Gram(s) IV Intermittent every 8 hours      PMHX/PSHX:  Alcohol abuse    No significant past surgical history            ROS: 14 point ROS negative unless otherwise stated in subjective      PHYSICAL EXAM:     GENERAL:  Appears stated age, well-groomed, +chronically ill appearing  HEENT:  NC/AT, +scleral icterus  CHEST:  Full & symmetric excursion, +diminished breath sounds in RLL field; +LLL crackles; +NC  HEART:  Regular rhythm, S1, S2, no murmur/rub/S3/S4  ABDOMEN:  Soft, non-tender, +mildly-distended  EXTREMITIES: 2+ BL edema  SKIN:  +jaundice  NEURO:  Alert, orientedx3, no asterixis    Vital Signs:  Vital Signs Last 24 Hrs  T(C): 37.1 (20 Mar 2023 04:31), Max: 37.2 (19 Mar 2023 20:39)  T(F): 98.8 (20 Mar 2023 04:31), Max: 98.9 (19 Mar 2023 20:39)  HR: 97 (20 Mar 2023 04:31) (97 - 109)  BP: 100/61 (20 Mar 2023 04:31) (92/58 - 100/61)  BP(mean): --  RR: 18 (20 Mar 2023 04:31) (18 - 18)  SpO2: 91% (20 Mar 2023 04:31) (91% - 95%)    Parameters below as of 20 Mar 2023 04:31  Patient On (Oxygen Delivery Method): nasal cannula  O2 Flow (L/min): 2.5    Daily     Daily     LABS:                        11.4   21.07 )-----------( 150      ( 19 Mar 2023 07:18 )             33.4     03-19    133<L>  |  97  |  10  ----------------------------<  108<H>  3.6   |  21<L>  |  0.98    Ca    7.0<L>      19 Mar 2023 07:12  Phos  2.4     -  Mg     1.7         TPro  5.4<L>  /  Alb  2.1<L>  /  TBili  18.0<H>  /  DBili  x   /  AST  185<H>  /  ALT  54<H>  /  AlkPhos  138<H>      LIVER FUNCTIONS - ( 19 Mar 2023 07:12 )  Alb: 2.1 g/dL / Pro: 5.4 g/dL / ALK PHOS: 138 U/L / ALT: 54 U/L / AST: 185 U/L / GGT: x           PT/INR - ( 19 Mar 2023 07:20 )   PT: 46.3 sec;   INR: 3.94 ratio         PTT - ( 19 Mar 2023 07:20 )  PTT:65.5 sec  Urinalysis Basic - ( 19 Mar 2023 06:59 )    Color: Dark Yellow / Appearance: Slightly Turbid / S.014 / pH: x  Gluc: x / Ketone: Negative  / Bili: Large >10 / Urobili: Negative   Blood: x / Protein: Trace / Nitrite: Negative   Leuk Esterase: Negative / RBC: 2 /hpf / WBC 11 /HPF   Sq Epi: x / Non Sq Epi: 3 /hpf / Bacteria: Negative          Imaging: No new abdominal imaging

## 2023-03-20 NOTE — PROGRESS NOTE ADULT - NSPROGADDITIONALINFOA_GEN_ALL_CORE
left VM for daughter Amisha to update
daughter Amisha at bedside updated of the plan
updated  at bedside

## 2023-03-20 NOTE — BH CONSULTATION LIAISON ASSESSMENT NOTE - NSBHSACONSEQUENCE_PSY_A_CORE FT
Has attempted to do therapy x 3 months but did not find it helpful, however had continued to drink throughout it. Denies hx of AA/rehab.  no hx of withdrawal seizures/DTs/AMS/medical admissions for alcohol withdrawal

## 2023-03-20 NOTE — BH CONSULTATION LIAISON ASSESSMENT NOTE - NSBHATTESTCOMMENTATTENDFT_PSY_A_CORE
59 y/o  F, emigrated from [place] in 1993/Polish speaking, currently working as a , no reported pphx including inpatient hospitalizations, outpatient tx, SA, SIB, reported hx of etoh abuse c/b cirrhosis who has been transferred to [facility] for liver transplant evaluation.  --  Patient motivated to partake in recommended EtOH cessation treatment ( rehab and MAT) but states she is unaware about the transplantation process currently. Initially seen at bedside with spouse who notes "glasses" of EtOH consumption with some remote history of rehab versus "therapy." Notes both father and cousins struggle w/ EtOH dependance. Will fully re-evaluate once further transplant education has been provided to gauge understanding and ability to rationally manipulate information and recommendations of treatement compliance.

## 2023-03-20 NOTE — CHART NOTE - NSCHARTNOTEFT_GEN_A_CORE
Gender: [  ] Male   [x ] Female    Dominant hand  strength:        attempt 1:  kg 13       attempt 2:  kg 15       attempt 3:  kg 12    Standing balance:       Side:  sec 10       SemiTandem: sec 10       Tandem: sec 0    5x sit<>stand:  sec 17    Liver Frailty Index Score:  4.9    [ x] Frail  [   ] Pre-frail   [   ] Robust    Percentile: 92

## 2023-03-20 NOTE — BH CONSULTATION LIAISON ASSESSMENT NOTE - SUMMARY
This is a 57 y/o  F, emigrated from [place] in 1993/Polish speaking, currently working as a , no reported pphx including inpatient hospitalizations, outpatient tx, SA, SIB, reported hx of etoh abuse c/b cirrhosis who has been transferred to [facility] for liver transplant evaluation.    Psychiatry consulted for  pre liver transplant evaluation.    Patient seen and evaluated with attending and PA-S. Pt's  present at bedside, patient provided verbal consent to speak w/ him.    She presents as awake and alert, calm, good eye contact, soft spoken. Shares that she learned of her liver issues ~ 3 weeks ago when she experienced full body jaundice and went to see a doctor. Upon learning of this, she attempted to stop drinking on her own but found it difficult d/t experiencing mild withdrawal sxs. Reports she felt worried and upset about what would happen but continued to seek medical care to properly address her issues, has identified her family as being supportive during this time. She was unaware that psychiatry would be coming to evaluate her as part of the liver transplant evaluation process. She and her  also share that no one has explained what the liver transplant process would entail including details surrounding the surgery itself, risks, need for medications/their s/e's, what recovery process is like. She states that following the transplant, she would be able to take time from work to recover, mentioning her  would be able to support them financially and anytime he is not at home to help take care of her, their adult daughter whom lives nearby would be able to stay over as she works remotely 100% of the time.     Patient denying sxs of anxiety, depression, sherwin, psychosis. Patient states she plans to abstain from alcohol, is amenable to attending rehab and taking MAT. Identifies support from family for physical needs and financially. Does not appear to have education regarding liver txp process.     SIPAT: Pending education from transplant team    Recommendations:  - No indication for psychiatric CO  - Labs: PETH, Tsh, B12, Folate, Vit D, mag, phos, Utox  - Medications:  If pt has not received high dose thiamine at OSH, would administer Thiamine 500mg IVP TID x 3-5days  c/w folate supplementation  Melatonin 3mg po Q8pm  Will defer starting acamprosate at this time  No s/s of alcohol withdrawal   -Patient + support system would benefit from education regarding transplant process Polish  -Dispo: Can f/u at [facility] psychiatric transplant clinic / LUCILLE ELIZABETH

## 2023-03-20 NOTE — PROGRESS NOTE ADULT - ASSESSMENT
58 year old English and Polish speaking F w/ AUD presented to [facility] on 3/13/23 for 2 weeks of worsening generalized weakness in setting of lower extremity edema yellowing of the eyes, and ascites and was found to have elevated liver enzymes as well as CT findings concerning for Budd-chiarri. She was treated with ABX for possible UTI and SBP prophylaxis and  transferred to [facility] for further evaluation on 3/16. History obtained from pt and her daughter. Over the last 6 months has been drinking more alcohol at home (mostly vodka). Had been drinking ETOH before, after and while at work. Works as a ; last worked 12/2022. She lives with her  who also drinks (but reportedly drinks less that the pt). Pt has 2 children- one daughter who lives in [place] and a son in [place]. Pt went to [facility] ETOH rehab (group and individual therapy) starting in 01/2023 but has still been drinking despite therapy. Last drink was 3/9/23.      #AUD- MDF 94 (poor prognosis); UNOS/OPTN MELDNa 30 (3/19); ABO: pending. Eval started 3/17  #c/f Budd-Castroari. US neg.   -s/p para with 3.9 L removed, neg for SBP; SAAG > 1.1.   -US venous duplex (3/16) BL LE neg for DVT.  -workup: (-)HAV IgM; (-)HBV sAg; (-)HBV cAb IgM; (-)HCV Ab, MIKO neg, Alpha 1 antitrypsin neg,   - Blood type: O negative.   - Infectious work up: CXR showed pleural effusions, UA neg.   - TTE obtained on 3/16 - showed bileaflet mitral valve prolapse with moderate MR, EF 60-65%.     #Possible sepsis  Has persistent fevers with leukocytosis, no infectious source identified.   - blood Cx from 3/17 neg, will repeat it.   - SBP neg.   - Repeat CXR no pneumonia.   - UA neg.   - Broadened abx to Zosyn at this time.     Recs:  - continue with Zosyn for now.   - Discontinue diuretics at this time as her blood pressures are low.   - Start her on Albumin 100 cc Q8 hours.   - Consider repeating paracentesis if she has ascites as she has persistent fevers.   - Please urine Na tomorrow morning.   - PETH levels pending.   - please consult transplant ID and cardiology for LT evaluation.   -please consult nutrition/PT/BH/SW   -daily CMP, CBC, INR  -c/w MVI/thiamine/folic acid  -DVT ppx  - pending smooth muscle Ab, anti-LKM-1 Ab, and immunoglobulin panel   - will eventually need EGD for EV screening.   - Replete Mg and PO4 levels, please obtain them daily.   - Recommend transferring to [address] [place] [address] for LT eval when bed is available    **THIS NOTE IS NOT FINALIZED UNTIL SIGNED BY THE ATTENDING**    Chely Randall MD  GI Fellow, PGY-5  Available via Microsoft Teams    NON-URGENT CONSULTS:  Please email [email or web address]  [email or web address]  AT NIGHT AND ON WEEKENDS:  Contact on-call GI fellow via answering service ([phone]) from 5pm-8am and on weekends/holidays  MONDAY-FRIDAY 8AM-5PM:  Pager# [phone] ([facility]) or [phone] ([facility])  GI Phone# [phone] ([facility])     58 year old English and Polish speaking F w/ AUD presented to [facility] on 3/13/23 for 2 weeks of worsening generalized weakness in setting of lower extremity edema yellowing of the eyes, and ascites and was found to have elevated liver enzymes as well as CT findings concerning for Budd-chiarri. She was treated with ABX for possible UTI and SBP prophylaxis and  transferred to [facility] for further evaluation on 3/16. History obtained from pt and her daughter. Over the last 6 months has been drinking more alcohol at home (mostly vodka). Had been drinking ETOH before, after and while at work. Works as a ; last worked 2022. She lives with her  who also drinks (but reportedly drinks less that the pt). Pt has 2 children- one daughter who lives in [place] and a son in [place]. Pt went to [facility] ETOH rehab (group and individual therapy) starting in 2023 but has still been drinking despite therapy. Last drink was 3/9/23.      #AUD- MDF 94 (poor prognosis); consented for LT eval (3/17), not yet listed; UNOS/OPTN MELDNa 31 (3/20); ABO: O.   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   -s/p para with 3.9 L removed, neg for SBP; SAAG > 1.1.   -US venous duplex (3/16) BL LE neg for DVT.  -US abd doppler (3/17) - Reversal of flow in the main and right portal veins, c/w portal hypertension. Portosystemic collateral formation as evidenced by a recannalized umbilical vein, which is in keeping with findings of portal hypertension. No evidence of thrombus within the IVC, main, left, and right hepatic veins. Patent hepatic arteries.  -transplant workup: normal A1AT; normal ceruloplasmin; (-)HAV IgM; (-)HBV sAg; (-)HBV cAb IgM; (-)HBV DNA PCR; (-)HCV Ab, (-)MIKO neg, (+)Ig; (+)IgA: 1070; (-)IgM; (-)AMA, (-)ASMA; (-)LKM Ab; (+)VZV IgG; (+)mumps IgG; (+)measles IgG; (+)rubella IgG; (-)toxoplasma IgG; (-)syphilis screen; (-)HIV Ab         -LT candidacy and evaluation:            [x] Blood type: O            [ ] Psychosocial            [ ] Infectious            [ ] Cardiology:        TTE (3/16) - showed bileaflet mitral valve prolapse with moderate MR, EF 60-65%.                    Cardiac cath:                    Stress test:             [ ] Colonoscopy:             [ ] Mammography:             [ ] Pap smear:             [x] Dental: not needed per pt's insurance            [ ] PT/Frailty    #Possible sepsis  Has persistent fevers with leukocytosis, no infectious source identified.   -Infectious work up: CXR () worsening L basilar infiltrate, UA neg. bl cx x2 (3/17, 3/18) NGTD. Ascitic fluid cx (3/17) NGTD.    Recs:  - c/w Zosyn (3/18 ->)  - c/w MVI/thiamine/folic acid  - IV lasix 40 x1 today  - Albumin 100 cc 25% q8h (3/19-3/22)  - f/u PETH   - please consult nutrition/PT/BH/SW   - trend clinical symptoms, exam findings, vital signs, CBC, CMP, INR, Mg, phosphorus  - rule out viral hepatitis with EBV PCR, CMV IgG & PCR; HEV Ab  - check RVP (daughter recently tested positive for COVID)  - Social work evaluation +/- psychiatry eval as needed  - Infectious disease consultation       -f/u quantiferon gold for TB screening  - Cardiology consultation  - Recommend transferring to [address] [place] [address] for LT eval when bed is available  - will eventually need EGD +/- when respiratory status is optimized    Bundle:  DVT ppx: Lovenox  Diet: low sodium  NOK: Aiden Reyes () [phone]; Amisha Parker (daughter)- [phone]  IV access: PIV  Dispo: TBD  Code status: full code      **THIS NOTE IS NOT FINALIZED UNTIL SIGNED BY THE ATTENDING**    Chely Randall MD  GI Fellow, PGY-5  Available via Microsoft Teams    NON-URGENT CONSULTS:  Please email [email or web address]  AT NIGHT AND ON WEEKENDS:  Contact on-call GI fellow via answering service ([phone]) from 5pm-8am and on weekends/holidays  MONDAY-FRIDAY 8AM-5PM:  Pager# [phone] ([facility]) or [phone] ([facility])  GI Phone# [phone] ([facility])

## 2023-03-20 NOTE — PROGRESS NOTE ADULT - PROBLEM SELECTOR PLAN 2
2/2 to pleural effusions ? hepatic hydrothorax, small could be 2.2, mod MR on echo normal LV systolic function  - Holding Lasix and Spironolactone   - monitor ins and outs  - wean 02 to maintain >92%

## 2023-03-20 NOTE — PROGRESS NOTE ADULT - SUBJECTIVE AND OBJECTIVE BOX
DATE OF SERVICE: 03-20-23 @ 14:39    Patient is a 58y old  Female who presents with a chief complaint of liver failure (20 Mar 2023 13:50)      INTERVAL HISTORY: Feels ok.     REVIEW OF SYSTEMS:  CONSTITUTIONAL: No weakness  EYES/ENT: No visual changes;  No throat pain   NECK: No pain or stiffness  RESPIRATORY: No cough, wheezing; No shortness of breath  CARDIOVASCULAR: No chest pain or palpitations  GASTROINTESTINAL: No abdominal  pain. No nausea, vomiting, or hematemesis  GENITOURINARY: No dysuria, frequency or hematuria  NEUROLOGICAL: No stroke like symptoms  SKIN: No rashes    	  MEDICATIONS:        PHYSICAL EXAM:  T(C): 36.7 (03-20-23 @ 09:00), Max: 37.2 (03-19-23 @ 20:39)  HR: 97 (03-20-23 @ 09:00) (97 - 109)  BP: 104/64 (03-20-23 @ 09:00) (93/58 - 104/64)  RR: 18 (03-20-23 @ 09:00) (18 - 18)  SpO2: 93% (03-20-23 @ 09:00) (91% - 95%)  Wt(kg): --  I&O's Summary    20 Mar 2023 07:01  -  20 Mar 2023 14:39  --------------------------------------------------------  IN: 660 mL / OUT: 0 mL / NET: 660 mL          Appearance: In no distress	  HEENT:    PERRL, EOMI	  Cardiovascular:  S1 S2, No JVD  Respiratory: Lungs clear to auscultation	  Gastrointestinal:  Soft, Non-tender, + BS	  Vascularature:  +2 edema of LE  Psychiatric: Appropriate affect   Neuro: no acute focal deficits                               10.1   17.68 )-----------( 128      ( 20 Mar 2023 07:24 )             29.8     03-20    133<L>  |  97  |  10  ----------------------------<  93  3.1<L>   |  24  |  0.58    Ca    7.2<L>      20 Mar 2023 07:24  Phos  1.2     03-20  Mg     1.9     03-20    TPro  5.6<L>  /  Alb  2.8<L>  /  TBili  19.1<H>  /  DBili  x   /  AST  148<H>  /  ALT  49<H>  /  AlkPhos  115  03-20        Labs personally reviewed      ASSESSMENT/PLAN: 	      58F EtOH abuse,  transferred from [facility]  for acute liver failure.     Problem/Plan - 1:  ·  Problem: Lower extremity edema.  ·  Plan: Likely 2/2 portal hypertension, less likely 2/2 HF  Echocardiogram. Preserved LV function,  Possible bileaflet MVP and Moderate MR  s/p lasix 40mg IV 3/16 with some response   Lasix and Aldactone now on hold d/t soft BP    Problem/Plan - 2:  ·  Problem: Acute hypoxemic respiratory failure.  ·  Plan: 2/2 to pleural effusions   Diuresis as noted above with PO Aldactone and Lasix  wean 02 to maintain >92%, currently on 2.5L saturating well.    Problem/Plan - 3:  ·  Problem: Need for prophylactic measure.   ·  Plan: lovenox 40.        Shannan Hilton, AG-NP   Jeffrey Melo DO [facility]  Cardiovascular Medicine  [address] [place] [address], [address]  Available through call or text on [employer] TEAMs  Office: [phone]   DATE OF SERVICE: 03-20-23 @ 14:39    Patient is a 58y old  Female who presents with a chief complaint of liver failure (20 Mar 2023 13:50)      INTERVAL HISTORY: Feels ok.     REVIEW OF SYSTEMS:  CONSTITUTIONAL: No weakness  EYES/ENT: No visual changes;  No throat pain   NECK: No pain or stiffness  RESPIRATORY: No cough, wheezing; No shortness of breath  CARDIOVASCULAR: No chest pain or palpitations  GASTROINTESTINAL: No abdominal  pain. No nausea, vomiting, or hematemesis  GENITOURINARY: No dysuria, frequency or hematuria  NEUROLOGICAL: No stroke like symptoms  SKIN: No rashes    	  MEDICATIONS:        PHYSICAL EXAM:  T(C): 36.7 (03-20-23 @ 09:00), Max: 37.2 (03-19-23 @ 20:39)  HR: 97 (03-20-23 @ 09:00) (97 - 109)  BP: 104/64 (03-20-23 @ 09:00) (93/58 - 104/64)  RR: 18 (03-20-23 @ 09:00) (18 - 18)  SpO2: 93% (03-20-23 @ 09:00) (91% - 95%)  Wt(kg): --  I&O's Summary    20 Mar 2023 07:01  -  20 Mar 2023 14:39  --------------------------------------------------------  IN: 660 mL / OUT: 0 mL / NET: 660 mL          Appearance: In no distress	  HEENT:    PERRL, EOMI	  Cardiovascular:  S1 S2, No JVD  Respiratory: Lungs clear to auscultation	  Gastrointestinal:  Soft, Non-tender, + BS	  Vascularature:  +2 edema of LE  Psychiatric: Appropriate affect   Neuro: no acute focal deficits                               10.1   17.68 )-----------( 128      ( 20 Mar 2023 07:24 )             29.8     03-20    133<L>  |  97  |  10  ----------------------------<  93  3.1<L>   |  24  |  0.58    Ca    7.2<L>      20 Mar 2023 07:24  Phos  1.2     03-20  Mg     1.9     03-20    TPro  5.6<L>  /  Alb  2.8<L>  /  TBili  19.1<H>  /  DBili  x   /  AST  148<H>  /  ALT  49<H>  /  AlkPhos  115  03-20        Labs personally reviewed      ASSESSMENT/PLAN: 	      58F EtOH abuse,  transferred from [facility]  for acute liver failure.     Problem/Plan - 1:  ·  Problem: Lower extremity edema.  ·  Plan: Likely 2/2 portal hypertension, less likely 2/2 HF  Echocardiogram. Preserved LV function,  Possible bileaflet MVP and Moderate MR  s/p lasix 40mg IV 3/16 with some response   Lasix and Aldactone now on hold d/t soft BP    Problem/Plan - 2:  ·  Problem: Acute hypoxemic respiratory failure.  ·  Plan: 2/2 to pleural effusions   Diuresis as noted above with PO Aldactone and Lasix  wean 02 to maintain >92%, currently on 2.5L saturating well.    Problem/Plan - 3:  ·  Problem: Need for prophylactic measure.   ·  Plan: lovenox 40.        Shannan Hilton, AG-NP   Jeffrey Melo DO [facility]  Cardiovascular Medicine  [address] [place] [address], [address]  Available through call or text on [employer] TE[employer]  Office: [phone]

## 2023-03-20 NOTE — PROGRESS NOTE ADULT - PROBLEM SELECTOR PLAN 1
Currently no localizing symptoms other then mild abdominal pain. No cough, diarrhea, urinary symptoms skin lesions. UA negative, RVP negative, paracentesis negative for SBP. Prior duplex negative for DVT, GI pcr negative. CXR with possible basilar infiltrate. If infection ruled out fever and SIRS could be 2/2 alcoholic hepatitis  - Broadened to Zosyn  - Bcx sent NGTD  - Transplant ID eval

## 2023-03-20 NOTE — PROGRESS NOTE ADULT - SUBJECTIVE AND OBJECTIVE BOX
PROGRESS NOTE:   Prabha Dias DO  Hospitalist  Pager [phone]  After 5pm/weekends or if no answer ext: [phone]      Patient is a 58y old  Female who presents with a chief complaint of liver failure (20 Mar 2023 13:31)      SUBJECTIVE / OVERNIGHT EVENTS: ASHLEY feeling OK    ADDITIONAL REVIEW OF SYSTEMS:  no fever or chills no n/v/d    MEDICATIONS  (STANDING):  albumin human 25% IVPB 100 milliLiter(s) IV Intermittent every 8 hours  enoxaparin Injectable 40 milliGRAM(s) SubCutaneous every 24 hours  folic acid 1 milliGRAM(s) Oral daily  multivitamin 1 Tablet(s) Oral daily  piperacillin/tazobactam IVPB.. 3.375 Gram(s) IV Intermittent every 8 hours  potassium chloride    Tablet ER 40 milliEquivalent(s) Oral every 4 hours    MEDICATIONS  (PRN):  melatonin 3 milliGRAM(s) Oral at bedtime PRN Insomnia  ondansetron Injectable 4 milliGRAM(s) IV Push every 8 hours PRN Nausea and/or Vomiting      CAPILLARY BLOOD GLUCOSE        I&O's Summary      PHYSICAL EXAM:  Vital Signs Last 24 Hrs  T(C): 36.7 (20 Mar 2023 09:00), Max: 37.2 (19 Mar 2023 20:39)  T(F): 98.1 (20 Mar 2023 09:00), Max: 98.9 (19 Mar 2023 20:39)  HR: 97 (20 Mar 2023 09:00) (97 - 109)  BP: 104/64 (20 Mar 2023 09:00) (93/58 - 104/64)  BP(mean): --  RR: 18 (20 Mar 2023 09:00) (18 - 18)  SpO2: 93% (20 Mar 2023 09:00) (91% - 95%)    Parameters below as of 20 Mar 2023 09:00  Patient On (Oxygen Delivery Method): nasal cannula  O2 Flow (L/min): 2.5      CONSTITUTIONAL: NAD, well-developed, jaundice, non toxic  RESPIRATORY: Normal respiratory effort; lungs are clear to auscultation bilaterally  CARDIOVASCULAR: Regular rate and rhythm, normal S1 and S2, no murmur/rub/gallop; No lower extremity edema; Peripheral pulses are 2+ bilaterally  ABDOMEN: Nontender to palpation, normoactive bowel sounds, no rebound/guarding; No hepatosplenomegaly; distneded  MUSCLOSKELETAL: no clubbing or cyanosis of digits; no joint swelling or tenderness to palpation  PSYCH: A+O to person, place, and time; affect appropriate    LABS:                        10.1   17.68 )-----------( 128      ( 20 Mar 2023 07:24 )             29.8     03-20    133<L>  |  97  |  10  ----------------------------<  93  3.1<L>   |  24  |  0.58    Ca    7.2<L>      20 Mar 2023 07:24  Phos  1.2     03-20  Mg     1.9     03-20    TPro  5.6<L>  /  Alb  2.8<L>  /  TBili  19.1<H>  /  DBili  x   /  AST  148<H>  /  ALT  49<H>  /  AlkPhos  115  03-20    PT/INR - ( 20 Mar 2023 07:22 )   PT: 35.9 sec;   INR: 3.09 ratio         PTT - ( 19 Mar 2023 07:20 )  PTT:65.5 sec      Urinalysis Basic - ( 19 Mar 2023 06:59 )    Color: Dark Yellow / Appearance: Slightly Turbid / S.014 / pH: x  Gluc: x / Ketone: Negative  / Bili: Large >10 / Urobili: Negative   Blood: x / Protein: Trace / Nitrite: Negative   Leuk Esterase: Negative / RBC: 2 /hpf / WBC 11 /HPF   Sq Epi: x / Non Sq Epi: 3 /hpf / Bacteria: Negative        Culture - Blood (collected 18 Mar 2023 21:53)  Source: .Blood Blood-Peripheral  Preliminary Report (20 Mar 2023 06:01):    No growth to date.    Culture - Blood (collected 18 Mar 2023 21:53)  Source: .Blood Blood-Peripheral  Preliminary Report (20 Mar 2023 06:01):    No growth to date.        RADIOLOGY & ADDITIONAL TESTS:  Results Reviewed:   Imaging Personally Reviewed:  Electrocardiogram Personally Reviewed:    COORDINATION OF CARE:  Care Discussed with Consultants/Other Providers [Y/N]:  Prior or Outpatient Records Reviewed [Y/N]:

## 2023-03-20 NOTE — BH CONSULTATION LIAISON ASSESSMENT NOTE - HPI (INCLUDE ILLNESS QUALITY, SEVERITY, DURATION, TIMING, CONTEXT, MODIFYING FACTORS, ASSOCIATED SIGNS AND SYMPTOMS)
This is a 57 y/o  F, emigrated from [place] in 1993/Polish speaking, currently working as a , no reported pphx including inpatient hospitalizations, outpatient tx, SA, SIB, reported hx of etoh abuse c/b cirrhosis who has been transferred to [facility] for liver transplant evaluation.    Psychiatry consulted for  pre liver transplant evaluation.    Patient seen and evaluated with attending and ERICA. Pt's  present at bedside, patient provided verbal consent to speak w/ him.    She presents as awake and alert, calm, good eye contact, soft spoken. Shares that she learned of her liver issues ~ 3 weeks ago when she experienced full body jaundice and went to see a doctor. Upon learning of this, she attempted to stop drinking on her own but found it difficult d/t experiencing mild withdrawal sxs. Reports she felt worried and upset about what would happen but continued to seek medical care to properly address her issues, has identified her family as being supportive during this time. She was unaware that psychiatry would be coming to evaluate her as part of the liver transplant evaluation process. She and her  also share that no one has explained what the liver transplant process would entail including details surrounding the surgery itself, risks, need for medications/their s/e's, what recovery process is like. She states that following the transplant, she would be able to take time from work to recover, mentioning her  would be able to support them financially and anytime he is not at home to help take care of her, their adult daughter whom lives nearby would be able to stay over as she works remotely 100% of the time.   Patient states she plans to abstain from alcohol, is amenable to attending rehab and taking MAT.     With regard to sxs of depression, she does not she had been feeling down and sad upon learning of liver dx, however since she began receiving medical treatment her mood has improved. Reports issues w/ sleep while in the hospital d/t environmental noises but denies any sleeps disturbances while at home. Also reports decreased appetite. Denies feelings of hopelessness, thoughts of death, passive/active SI/I/M/P. Denies manic symptoms. Does report three nights ago, she did experience tremors and some disorientation that has since resolved. Denies any additional psychotic sxs.   Denies any sxs of anxiety.

## 2023-03-20 NOTE — BH CONSULTATION LIAISON ASSESSMENT NOTE - NSBHCHARTREVIEWVS_PSY_A_CORE FT
Vital Signs Last 24 Hrs  T(C): 36.7 (20 Mar 2023 09:00), Max: 37.2 (19 Mar 2023 20:39)  T(F): 98.1 (20 Mar 2023 09:00), Max: 98.9 (19 Mar 2023 20:39)  HR: 97 (20 Mar 2023 09:00) (97 - 109)  BP: 104/64 (20 Mar 2023 09:00) (93/58 - 104/64)  BP(mean): --  RR: 18 (20 Mar 2023 09:00) (18 - 18)  SpO2: 93% (20 Mar 2023 09:00) (91% - 95%)    Parameters below as of 20 Mar 2023 09:00  Patient On (Oxygen Delivery Method): nasal cannula  O2 Flow (L/min): 2.5

## 2023-03-20 NOTE — BH CONSULTATION LIAISON ASSESSMENT NOTE - RISK ASSESSMENT
Risk: Acute medical condition, alcohol use disorder, not in treatment  Protective: Motivated for sobriety, family support, financially stable, residential stability, employed

## 2023-03-20 NOTE — BH CONSULTATION LIAISON ASSESSMENT NOTE - NSBHCHARTREVIEWLAB_PSY_A_CORE FT
10.1   17.68 )-----------( 128      ( 20 Mar 2023 07:24 )             29.8   03-20    133<L>  |  97  |  10  ----------------------------<  93  3.1<L>   |  24  |  0.58    Ca    7.2<L>      20 Mar 2023 07:24  Phos  1.2     03-20  Mg     1.9     03-20    TPro  5.6<L>  /  Alb  2.8<L>  /  TBili  19.1<H>  /  DBili  x   /  AST  148<H>  /  ALT  49<H>  /  AlkPhos  115  03-20

## 2023-03-20 NOTE — BH CONSULTATION LIAISON ASSESSMENT NOTE - CURRENT MEDICATION
MEDICATIONS  (STANDING):  albumin human 25% IVPB 100 milliLiter(s) IV Intermittent every 8 hours  enoxaparin Injectable 40 milliGRAM(s) SubCutaneous every 24 hours  folic acid 1 milliGRAM(s) Oral daily  multivitamin 1 Tablet(s) Oral daily  piperacillin/tazobactam IVPB.. 3.375 Gram(s) IV Intermittent every 8 hours  potassium chloride    Tablet ER 40 milliEquivalent(s) Oral every 4 hours  thiamine 100 milliGRAM(s) Oral daily    MEDICATIONS  (PRN):  melatonin 3 milliGRAM(s) Oral at bedtime PRN Insomnia  ondansetron Injectable 4 milliGRAM(s) IV Push every 8 hours PRN Nausea and/or Vomiting

## 2023-03-20 NOTE — CONSULT NOTE ADULT - ASSESSMENT
58 year old female PMH EtOH abuse who presented to [facility] on 3/13/23 for worsening generalized weakness in setting of lower extremity edema and ascites over a two week period. Transferred to [facility] on 3/16/23 for further evaluation.       COVID19 PCR (3/16) Negative.   BCx (2/17, 2/18) NGTD.   UA (3/19) 11 WBC.   GI PCR (3/18) Negative.   Paracentesis (3/17) 68 nucleated cells with 4% PMN.     CXR (3/18) Bibasilar atelectasis mild congestion.   Doppler Abdomen (3/17) No evidence of thrombus within the IVC, main, left, and right hepatic veins.    At this point ID workup thus far is unrevealing  Fever this admission could be related to alcoholic hepatitis    #Fever, Leukocytosis  --Agree with checking RVP  --Would stop Zosyn in next 24 hours if ID workup remains unrevealing  --Continue to follow CBC with diff  --Continue to follow temperature curve  --Follow up on preliminary blood cultures    #Pre-Transplant Evaluation  --ID Clearance for OLT pending QuantiFeron Gold  --Recommend COVID19 Nucleocapsid Antibody (ordered)  --Follow up on Quantiferon Gold  --Follow up on Strongyloides Ab    #Encounter to Vaccinate Patient  COVID19: Pfizer 2x (last dose in 2021). Would benefit from Bivalent Booster  Influenza: Will require  Pneumococcal: Would benefit from PCV20  HAV: Will require Havrix  HBV: Will require Heplisav  MMR: Immune  Varicella: Immune  Shingles: Will require Shingrix  Tdap: Will require Tdap      I will continue to follow. Please feel free to contact me with any further questions.    Bradley Panchal M.D.  [facility] Division of Infectious Disease  8AM-5PM Monday - Friday: Available on [employer] Teams  After Hours and Holidays (or if no response on [employer] Teams): Please contact the Infectious Diseases Office at [phone]

## 2023-03-20 NOTE — CONSULT NOTE ADULT - SUBJECTIVE AND OBJECTIVE BOX
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     Patient reports last drink was about two weeks prior to admission , she reports no headache, no abdominal pain , no nausea or vomiting , no visual , tactile or auditory hallucinations. Patient reports increased lower extremity edema , as well as abdominal girth , she reports no prior history of ascites, she was started on spironolactone about two weeks prior to admission , with minimal relief.She also reports yellowing of skin and eyes during this time.  She reports no fever but does have chills. Patient also reports loose stool sometime with red blood , no melena no hematemesis.     COVID19 PCR (3/16) Negative  BCx (, ) NGTD  UA (3/19) 11 WBC  GI PCR (3/18) Negative  Paracentesis (3/17) 68 nucleated cells with 4% PMN    CXR (3/18) Bibasilar atelectasis mild congestion  Doppler Abdomen (3/17) No evidence of thrombus within the IVC, main, left, and right hepatic veins.     prior hospital charts reviewed [  ]  primary team notes reviewed [  ]  other consultant notes reviewed [  ]    PAST MEDICAL & SURGICAL HISTORY:  Alcohol abuse      No significant past surgical history          Allergies  No Known Allergies    ANTIMICROBIALS (past 90 days)  MEDICATIONS  (STANDING):  cefTRIAXone   IVPB   100 mL/Hr IV Intermittent (23 @ 06:08)    cefTRIAXone   IVPB   100 mL/Hr IV Intermittent (23 @ 14:27)    cefTRIAXone   IVPB   100 mL/Hr IV Intermittent (23 @ 07:24)   100 mL/Hr IV Intermittent (23 @ 06:07)    piperacillin/tazobactam IVPB.   200 mL/Hr IV Intermittent (23 @ 23:27)    piperacillin/tazobactam IVPB.-   25 mL/Hr IV Intermittent (23 @ 03:14)    piperacillin/tazobactam IVPB..   25 mL/Hr IV Intermittent (23 @ 12:07)   25 mL/Hr IV Intermittent (23 @ 02:09)   25 mL/Hr IV Intermittent (23 @ 18:06)   25 mL/Hr IV Intermittent (23 @ 11:17)      ANTIMICROBIALS:    piperacillin/tazobactam IVPB.. 3.375 every 8 hours    OTHER MEDS: MEDICATIONS  (STANDING):  enoxaparin Injectable 40 every 24 hours  melatonin 3 at bedtime PRN  ondansetron Injectable 4 every 8 hours PRN    SOCIAL HISTORY:   hx smoking  non-smoker    FAMILY HISTORY:  No pertinent family history in first degree relatives      REVIEW OF SYSTEMS  [  ] ROS unobtainable because:    [  ] All other systems negative except as noted below:	    Constitutional:  [ ] fever [ ] chills  [ ] weight loss  [ ] weakness  Skin:  [ ] rash [ ] phlebitis	  Eyes: [ ] icterus [ ] pain  [ ] discharge	  ENMT: [ ] sore throat  [ ] thrush [ ] ulcers [ ] exudates  Respiratory: [ ] dyspnea [ ] hemoptysis [ ] cough [ ] sputum	  Cardiovascular:  [ ] chest pain [ ] palpitations [ ] edema	  Gastrointestinal:  [ ] nausea [ ] vomiting [ ] diarrhea [ ] constipation [ ] pain	  Genitourinary:  [ ] dysuria [ ] frequency [ ] hematuria [ ] discharge [ ] flank pain  [ ] incontinence  Musculoskeletal:  [ ] myalgias [ ] arthralgias [ ] arthritis  [ ] back pain  Neurological:  [ ] headache [ ] seizures  [ ] confusion/altered mental status  Psychiatric:  [ ] anxiety [ ] depression	  Hematology/Lymphatics:  [ ] lymphadenopathy  Endocrine:  [ ] adrenal [ ] thyroid  Allergic/Immunologic:	 [ ] transplant [ ] seasonal    Vital Signs Last 24 Hrs  T(F): 98.1 (23 @ 09:00), Max: 101 (23 @ 15:55)  Vital Signs Last 24 Hrs  HR: 97 (23 @ 09:00) (97 - 109)  BP: 104/64 (23 @ 09:00) (93/58 - 104/64)  RR: 18 (23 @ 09:00)  SpO2: 93% (23 @ 09:00) (91% - 95%)  Wt(kg): --    PHYSICAL EXAMINATION:  General: Alert and Awake, NAD  HEENT: PERRL, EOMI, No subconjunctival hemorrhages, Oropharynx Clear, MMM  Neck: Supple, No SAMEERA  Cardiac: RRR, No M/R/G  Resp: CTAB, No Wh/Rh/Ra  Abdomen: NBS, NT/ND, No HSM, No rigidity or guarding  MSK: No LE edema. No stigmata of IE. No evidence of phlebitis. No evidence of synovitis.  : No martins  Skin: No rashes or lesions. Skin is warm and dry to the touch.   Neuro: Alert and Awake. CN 2-12 Grossly intact. Moves all four extremities spontaneously.  Psych: Calm, Pleasant, Cooperative                          10.1   17.68 )-----------( 128      ( 20 Mar 2023 07:24 )             29.8     03-20    133<L>  |  97  |  10  ----------------------------<  93  3.1<L>   |  24  |  0.58    Ca    7.2<L>      20 Mar 2023 07:24  Phos  1.2     03-20  Mg     1.9     03-20    TPro  5.6<L>  /  Alb  2.8<L>  /  TBili  19.1<H>  /  DBili  x   /  AST  148<H>  /  ALT  49<H>  /  AlkPhos  115  03-20    Urinalysis Basic - ( 19 Mar 2023 06:59 )    Color: Dark Yellow / Appearance: Slightly Turbid / S.014 / pH: x  Gluc: x / Ketone: Negative  / Bili: Large >10 / Urobili: Negative   Blood: x / Protein: Trace / Nitrite: Negative   Leuk Esterase: Negative / RBC: 2 /hpf / WBC 11 /HPF   Sq Epi: x / Non Sq Epi: 3 /hpf / Bacteria: Negative    MICROBIOLOGY:  Culture - Blood (collected 18 Mar 2023 21:53)  Source: .Blood Blood-Peripheral  Preliminary Report (20 Mar 2023 06:01):    No growth to date.    Culture - Blood (collected 18 Mar 2023 21:53)  Source: .Blood Blood-Peripheral  Preliminary Report (20 Mar 2023 06:01):    No growth to date.    Culture - Blood (collected 17 Mar 2023 13:45)  Source: .Blood Blood  Preliminary Report (18 Mar 2023 18:02):    No growth to date.    Culture - Blood (collected 17 Mar 2023 13:45)  Source: .Blood Blood  Preliminary Report (18 Mar 2023 18:02):    No growth to date.    Culture - Fungal, Body Fluid (collected 17 Mar 2023 10:49)  Source: Peritoneal Peritoneal Fluid  Preliminary Report (18 Mar 2023 07:49):    Testing in progress    Culture - Body Fluid with Gram Stain (collected 17 Mar 2023 10:49)  Source: Abdominal Fl Abdominal Fluid  Gram Stain (17 Mar 2023 18:48):    polymorphonuclear leukocytes seen    No organisms seen    by cytocentrifuge  Preliminary Report (18 Mar 2023 12:12):    No growth            Toxoplasma IgG Screen: <3.0 IU/mL (23 @ 07:34)  CMV IgG Antibody: <0.20 U/mL (23 @ 07:33)          RADIOLOGY:    <The imaging below has been reviewed and visualized by me independently. Findings as detailed in report below>     Patient is a 58y old  Female who presents with a chief complaint of liver failure (20 Mar 2023 06:22)    HPI:    Offer use of Polish  but patient's  provided translation    58 year old female PMHf EtOH abuse who presented to [facility] on 3/13/23 for worsening  generalized weakness in setting of lower extremity edema and ascites over a two week period. 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       Patient reports last drink was about two weeks prior to admission , she reports no headache, no abdominal pain , no nausea or vomiting , no visual , tactile or auditory hallucinations. Patient reports increased lower extremity edema , as well as abdominal girth , she reports no prior history of ascites, she was started on spironolactone about two weeks prior to admission , with minimal relief.She also reports yellowing of skin and eyes during this time.  She reports no fever but does have chills. Patient also reports loose stool sometime with red blood , no melena no hematemesis.     COVID19 PCR (3/16) Negative. BCx (, ) NGTD. UA (3/19) 11 WBC. GI PCR (3/18) Negative. Paracentesis (3/17) 68 nucleated cells with 4% PMN. CXR (3/18) Bibasilar atelectasis mild congestion. Doppler Abdomen (3/17) No evidence of thrombus within the IVC, main, left, and right hepatic veins.     prior hospital charts reviewed [  ]  primary team notes reviewed [ x ]  other consultant notes reviewed [ x ]    PAST MEDICAL & SURGICAL HISTORY:  Alcohol abuse      No significant past surgical history          Allergies  No Known Allergies    ANTIMICROBIALS (past 90 days)  MEDICATIONS  (STANDING):  cefTRIAXone   IVPB   100 mL/Hr IV Intermittent (23 @ 06:08)    cefTRIAXone   IVPB   100 mL/Hr IV Intermittent (23 @ 14:27)    cefTRIAXone   IVPB   100 mL/Hr IV Intermittent (23 @ 07:24)   100 mL/Hr IV Intermittent (23 @ 06:07)    piperacillin/tazobactam IVPB.   200 mL/Hr IV Intermittent (23 @ 23:27)    piperacillin/tazobactam IVPB.-   25 mL/Hr IV Intermittent (23 @ 03:14)    piperacillin/tazobactam IVPB..   25 mL/Hr IV Intermittent (23 @ 12:07)   25 mL/Hr IV Intermittent (23 @ 02:09)   25 mL/Hr IV Intermittent (23 @ 18:06)   25 mL/Hr IV Intermittent (23 @ 11:17)      ANTIMICROBIALS:    piperacillin/tazobactam IVPB.. 3.375 every 8 hours    OTHER MEDS: MEDICATIONS  (STANDING):  enoxaparin Injectable 40 every 24 hours  melatonin 3 at bedtime PRN  ondansetron Injectable 4 every 8 hours PRN    SOCIAL HISTORY:  No smoking. EtOH use as above. No drug use. Born in [place]. Moved to the  in . Primarily lived in [place] in the . some travel to [place]. Travel <1 month duration back to [place] and travel <1-2 week duration to the [facility] 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 No pets. Unsure of preceding latent tuberculosis testing.     FAMILY HISTORY:  No pertinent family history in first degree relatives      REVIEW OF SYSTEMS  [  ] ROS unobtainable because:    [x] All other systems negative except as noted below:	    Constitutional:  [ ] fever [ ] chills  [ ] weight loss  [ ] weakness  Skin:  [ ] rash [ ] phlebitis	  Eyes: [ ] icterus [ ] pain  [ ] discharge	  ENMT: [ ] sore throat  [ ] thrush [ ] ulcers [ ] exudates  Respiratory: [ ] dyspnea [ ] hemoptysis [ ] cough [ ] sputum	  Cardiovascular:  [ ] chest pain [ ] palpitations [ ] edema	  Gastrointestinal:  [ ] nausea [ ] vomiting [ ] diarrhea [ ] constipation [x ] pain	  Genitourinary:  [ ] dysuria [ ] frequency [ ] hematuria [ ] discharge [ ] flank pain  [ ] incontinence  Musculoskeletal:  [ ] myalgias [ ] arthralgias [ ] arthritis  [ ] back pain  Neurological:  [ ] headache [ ] seizures  [ ] confusion/altered mental status  Psychiatric:  [ ] anxiety [ ] depression	  Hematology/Lymphatics:  [ ] lymphadenopathy  Endocrine:  [ ] adrenal [ ] thyroid  Allergic/Immunologic:	 [ ] transplant [ ] seasonal    Vital Signs Last 24 Hrs  T(F): 98.1 (23 @ 09:00), Max: 101 (23 @ 15:55)  Vital Signs Last 24 Hrs  HR: 97 (23 @ 09:00) (97 - 109)  BP: 104/64 (23 @ 09:00) (93/58 - 104/64)  RR: 18 (23 @ 09:00)  SpO2: 93% (23 @ 09:00) (91% - 95%)  Wt(kg): --    PHYSICAL EXAMINATION:  General: Alert and Awake, jaundice  HEENT: PERRL, EOMI, Scleral Icterus  Neck: Supple, No SAMEERA  Cardiac: RRR, No M/R/G  Resp: CTAB, No Wh/Rh/Ra  Abdomen: NBS, Moderate distension, Diffuse mild tenderness to palpation, No HSM, No rigidity or guarding  MSK: No LE edema. No stigmata of IE. No evidence of phlebitis. No evidence of synovitis.  Skin: No rashes or lesions. Skin is warm and dry to the touch.   Neuro: Alert and Awake. CN 2-12 Grossly intact. Moves all four extremities spontaneously.  Psych: Calm, Pleasant, Cooperative                          10.1   17.68 )-----------( 128      ( 20 Mar 2023 07:24 )             29.8     03-20    133<L>  |  97  |  10  ----------------------------<  93  3.1<L>   |  24  |  0.58    Ca    7.2<L>      20 Mar 2023 07:24  Phos  1.2     -20  Mg     1.9     -20    TPro  5.6<L>  /  Alb  2.8<L>  /  TBili  19.1<H>  /  DBili  x   /  AST  148<H>  /  ALT  49<H>  /  AlkPhos  115  03-20    Urinalysis Basic - ( 19 Mar 2023 06:59 )    Color: Dark Yellow / Appearance: Slightly Turbid / S.014 / pH: x  Gluc: x / Ketone: Negative  / Bili: Large >10 / Urobili: Negative   Blood: x / Protein: Trace / Nitrite: Negative   Leuk Esterase: Negative / RBC: 2 /hpf / WBC 11 /HPF   Sq Epi: x / Non Sq Epi: 3 /hpf / Bacteria: Negative    MICROBIOLOGY:    Culture - Blood (collected 18 Mar 2023 21:53)  Source: .Blood Blood-Peripheral  Preliminary Report (20 Mar 2023 06:01):    No growth to date.    Culture - Blood (collected 18 Mar 2023 21:53)  Source: .Blood Blood-Peripheral  Preliminary Report (20 Mar 2023 06:01):    No growth to date.    Culture - Blood (collected 17 Mar 2023 13:45)  Source: .Blood Blood  Preliminary Report (18 Mar 2023 18:02):    No growth to date.    Culture - Blood (collected 17 Mar 2023 13:45)  Source: .Blood Blood  Preliminary Report (18 Mar 2023 18:02):    No growth to date.    Culture - Fungal, Body Fluid (collected 17 Mar 2023 10:49)  Source: Peritoneal Peritoneal Fluid  Preliminary Report (18 Mar 2023 07:49):    Testing in progress    Culture - Body Fluid with Gram Stain (collected 17 Mar 2023 10:49)  Source: Abdominal Fl Abdominal Fluid  Gram Stain (17 Mar 2023 18:48):    polymorphonuclear leukocytes seen    No organisms seen    by cytocentrifuge  Preliminary Report (18 Mar 2023 12:12):    No growth    Toxoplasma IgG Screen: <3.0 IU/mL (23 @ 07:34)  CMV IgG Antibody: <0.20 U/mL (23 @ 07:33)    RADIOLOGY:    <The imaging below has been reviewed and visualized by me independently. Findings as detailed in report below>    < from: Xray Chest 1 View- PORTABLE-Urgent (Xray Chest 1 View- PORTABLE-Urgent .) (23 @ 09:32) >  IMPRESSION:    First study is from 3/18, 4:43 PM and shows normal size heart. Bibasilar   atelectasis mild congestion. Bones within normal limits.    The follow-up is from 3/19, a 40 4:00 AM and shows worsening left basilar   infiltrate..    < end of copied text >

## 2023-03-20 NOTE — BH CONSULTATION LIAISON ASSESSMENT NOTE - NSBHCONSULTFOLLOWAFTERCARE_PSY_A_CORE FT
LUCILLE ELIZABETH [phone]  [facility] psychiatric transplant clinic [phone]

## 2023-03-20 NOTE — PROGRESS NOTE ADULT - PROBLEM SELECTOR PLAN 3
ETOH suspected but other serologies pending, budd chiari negative on US  -s/p para with 3.9 L removed, neg for SBP; SAAG > 1.1.   -US venous duplex (3/16) BL LE neg for DVT.  -workup: (-)HAV IgM; (-)HBV sAg; (-)HBV cAb IgM; (-)HCV Ab, MIKO neg, Alpha 1 antitrypsin neg,   - Blood type: O negative.   - Infectious work up: CXR showed pleural effusions, UA neg.   - TTE obtained on 3/16 - showed bileaflet mitral valve prolapse with moderate MR, EF 60-65%.

## 2023-03-20 NOTE — BH CONSULTATION LIAISON ASSESSMENT NOTE - NSBHSAALC_PSY_A_CORE FT
Patient reports longstanding hx of etoh abuse, drinking 3-4 glasses of liquor daily. Does not specify the sizes of the glasses.  Patient reports longstanding hx of etoh abuse, drinking 3-4 glasses of liquor daily. Does not specify the sizes of the glasses. Last drink ~ 3 weeks ago

## 2023-03-21 ENCOUNTER — TRANSCRIPTION ENCOUNTER (OUTPATIENT)
Age: 58
End: 2023-03-21

## 2023-03-21 PROBLEM — Z00.00 ENCOUNTER FOR PREVENTIVE HEALTH EXAMINATION: Status: ACTIVE | Noted: 2023-03-21

## 2023-03-21 LAB
ALBUMIN SERPL ELPH-MCNC: 3 G/DL — LOW (ref 3.3–5)
ALP SERPL-CCNC: 111 U/L — SIGNIFICANT CHANGE UP (ref 40–120)
ALT FLD-CCNC: 44 U/L — SIGNIFICANT CHANGE UP (ref 10–45)
ANION GAP SERPL CALC-SCNC: 12 MMOL/L — SIGNIFICANT CHANGE UP (ref 5–17)
ANION GAP SERPL CALC-SCNC: 15 MMOL/L — SIGNIFICANT CHANGE UP (ref 5–17)
AST SERPL-CCNC: 118 U/L — HIGH (ref 10–40)
BASOPHILS # BLD AUTO: 0.09 K/UL — SIGNIFICANT CHANGE UP (ref 0–0.2)
BASOPHILS NFR BLD AUTO: 0.7 % — SIGNIFICANT CHANGE UP (ref 0–2)
BILIRUB SERPL-MCNC: 19.5 MG/DL — HIGH (ref 0.2–1.2)
BUN SERPL-MCNC: 5 MG/DL — LOW (ref 7–23)
BUN SERPL-MCNC: 5 MG/DL — LOW (ref 7–23)
CALCIUM SERPL-MCNC: 7.5 MG/DL — LOW (ref 8.4–10.5)
CALCIUM SERPL-MCNC: 7.8 MG/DL — LOW (ref 8.4–10.5)
CHLORIDE SERPL-SCNC: 100 MMOL/L — SIGNIFICANT CHANGE UP (ref 96–108)
CHLORIDE SERPL-SCNC: 99 MMOL/L — SIGNIFICANT CHANGE UP (ref 96–108)
CO2 SERPL-SCNC: 22 MMOL/L — SIGNIFICANT CHANGE UP (ref 22–31)
CO2 SERPL-SCNC: 24 MMOL/L — SIGNIFICANT CHANGE UP (ref 22–31)
COVID-19 NUCLEOCAPSID GAM AB INTERP: POSITIVE
COVID-19 NUCLEOCAPSID TOTAL GAM ANTIBODY RESULT: 176 INDEX — HIGH
CREAT SERPL-MCNC: 0.36 MG/DL — LOW (ref 0.5–1.3)
CREAT SERPL-MCNC: 0.39 MG/DL — LOW (ref 0.5–1.3)
EGFR: 116 ML/MIN/1.73M2 — SIGNIFICANT CHANGE UP
EGFR: 118 ML/MIN/1.73M2 — SIGNIFICANT CHANGE UP
EOSINOPHIL # BLD AUTO: 0.11 K/UL — SIGNIFICANT CHANGE UP (ref 0–0.5)
EOSINOPHIL NFR BLD AUTO: 0.8 % — SIGNIFICANT CHANGE UP (ref 0–6)
GLUCOSE SERPL-MCNC: 107 MG/DL — HIGH (ref 70–99)
GLUCOSE SERPL-MCNC: 112 MG/DL — HIGH (ref 70–99)
HCT VFR BLD CALC: 30.6 % — LOW (ref 34.5–45)
HGB BLD-MCNC: 10.4 G/DL — LOW (ref 11.5–15.5)
IMM GRANULOCYTES NFR BLD AUTO: 1.3 % — HIGH (ref 0–0.9)
INR BLD: 3.04 RATIO — HIGH (ref 0.88–1.16)
LYMPHOCYTES # BLD AUTO: 0.99 K/UL — LOW (ref 1–3.3)
LYMPHOCYTES # BLD AUTO: 7.2 % — LOW (ref 13–44)
MAGNESIUM SERPL-MCNC: 1.9 MG/DL — SIGNIFICANT CHANGE UP (ref 1.6–2.6)
MAGNESIUM SERPL-MCNC: 2 MG/DL — SIGNIFICANT CHANGE UP (ref 1.6–2.6)
MCHC RBC-ENTMCNC: 34 GM/DL — SIGNIFICANT CHANGE UP (ref 32–36)
MCHC RBC-ENTMCNC: 35.9 PG — HIGH (ref 27–34)
MCV RBC AUTO: 105.5 FL — HIGH (ref 80–100)
MONOCYTES # BLD AUTO: 1.86 K/UL — HIGH (ref 0–0.9)
MONOCYTES NFR BLD AUTO: 13.5 % — SIGNIFICANT CHANGE UP (ref 2–14)
NEUTROPHILS # BLD AUTO: 10.52 K/UL — HIGH (ref 1.8–7.4)
NEUTROPHILS NFR BLD AUTO: 76.5 % — SIGNIFICANT CHANGE UP (ref 43–77)
NON-GYNECOLOGICAL CYTOLOGY STUDY: SIGNIFICANT CHANGE UP
NRBC # BLD: 0 /100 WBCS — SIGNIFICANT CHANGE UP (ref 0–0)
PHOSPHATE SERPL-MCNC: 0.9 MG/DL — CRITICAL LOW (ref 2.5–4.5)
PHOSPHATE SERPL-MCNC: 1.7 MG/DL — LOW (ref 2.5–4.5)
PLATELET # BLD AUTO: 123 K/UL — LOW (ref 150–400)
POTASSIUM SERPL-MCNC: 3.2 MMOL/L — LOW (ref 3.5–5.3)
POTASSIUM SERPL-MCNC: 3.5 MMOL/L — SIGNIFICANT CHANGE UP (ref 3.5–5.3)
POTASSIUM SERPL-SCNC: 3.2 MMOL/L — LOW (ref 3.5–5.3)
POTASSIUM SERPL-SCNC: 3.5 MMOL/L — SIGNIFICANT CHANGE UP (ref 3.5–5.3)
PROT SERPL-MCNC: 5.6 G/DL — LOW (ref 6–8.3)
PROTHROM AB SERPL-ACNC: 35.3 SEC — HIGH (ref 10.5–13.4)
RAPID RVP RESULT: SIGNIFICANT CHANGE UP
RBC # BLD: 2.9 M/UL — LOW (ref 3.8–5.2)
RBC # FLD: 16.3 % — HIGH (ref 10.3–14.5)
SARS-COV-2 IGG+IGM SERPL QL IA: 176 INDEX — HIGH
SARS-COV-2 IGG+IGM SERPL QL IA: POSITIVE
SARS-COV-2 RNA SPEC QL NAA+PROBE: SIGNIFICANT CHANGE UP
SODIUM SERPL-SCNC: 136 MMOL/L — SIGNIFICANT CHANGE UP (ref 135–145)
SODIUM SERPL-SCNC: 136 MMOL/L — SIGNIFICANT CHANGE UP (ref 135–145)
STRONGYLOIDES AB SER-ACNC: NEGATIVE — SIGNIFICANT CHANGE UP
WBC # BLD: 13.75 K/UL — HIGH (ref 3.8–10.5)
WBC # FLD AUTO: 13.75 K/UL — HIGH (ref 3.8–10.5)

## 2023-03-21 PROCEDURE — 99233 SBSQ HOSP IP/OBS HIGH 50: CPT | Mod: GC

## 2023-03-21 PROCEDURE — 76641 ULTRASOUND BREAST COMPLETE: CPT | Mod: 26,50

## 2023-03-21 RX ORDER — FUROSEMIDE 40 MG
40 TABLET ORAL ONCE
Refills: 0 | Status: DISCONTINUED | OUTPATIENT
Start: 2023-03-21 | End: 2023-03-21

## 2023-03-21 RX ORDER — FUROSEMIDE 40 MG
80 TABLET ORAL ONCE
Refills: 0 | Status: COMPLETED | OUTPATIENT
Start: 2023-03-21 | End: 2023-03-21

## 2023-03-21 RX ORDER — POTASSIUM CHLORIDE 20 MEQ
20 PACKET (EA) ORAL
Refills: 0 | Status: COMPLETED | OUTPATIENT
Start: 2023-03-21 | End: 2023-03-21

## 2023-03-21 RX ADMIN — Medication 80 MILLIGRAM(S): at 11:27

## 2023-03-21 RX ADMIN — Medication 20 MILLIEQUIVALENT(S): at 10:29

## 2023-03-21 RX ADMIN — Medication 20 MILLIEQUIVALENT(S): at 14:10

## 2023-03-21 RX ADMIN — Medication 1 TABLET(S): at 11:26

## 2023-03-21 RX ADMIN — Medication 50 MILLILITER(S): at 05:08

## 2023-03-21 RX ADMIN — Medication 85 MILLIMOLE(S): at 13:05

## 2023-03-21 RX ADMIN — Medication 1 MILLIGRAM(S): at 11:27

## 2023-03-21 RX ADMIN — Medication 20 MILLIEQUIVALENT(S): at 11:27

## 2023-03-21 RX ADMIN — ENOXAPARIN SODIUM 40 MILLIGRAM(S): 100 INJECTION SUBCUTANEOUS at 16:39

## 2023-03-21 RX ADMIN — PIPERACILLIN AND TAZOBACTAM 25 GRAM(S): 4; .5 INJECTION, POWDER, LYOPHILIZED, FOR SOLUTION INTRAVENOUS at 11:38

## 2023-03-21 RX ADMIN — PIPERACILLIN AND TAZOBACTAM 25 GRAM(S): 4; .5 INJECTION, POWDER, LYOPHILIZED, FOR SOLUTION INTRAVENOUS at 22:14

## 2023-03-21 RX ADMIN — PIPERACILLIN AND TAZOBACTAM 25 GRAM(S): 4; .5 INJECTION, POWDER, LYOPHILIZED, FOR SOLUTION INTRAVENOUS at 02:03

## 2023-03-21 RX ADMIN — Medication 50 MILLILITER(S): at 17:48

## 2023-03-21 RX ADMIN — Medication 100 MILLIGRAM(S): at 11:26

## 2023-03-21 NOTE — PROGRESS NOTE ADULT - SUBJECTIVE AND OBJECTIVE BOX
Chief Complaint:  Patient is a 58y old  Female who presents with a chief complaint of liver failure (20 Mar 2023 14:39)      Interval Events: Reports feeling well. Denies any N/V/D/C, abd pain, melena or hematochezia.      Hospital Medications:  albumin human 25% IVPB 100 milliLiter(s) IV Intermittent every 8 hours  enoxaparin Injectable 40 milliGRAM(s) SubCutaneous every 24 hours  folic acid 1 milliGRAM(s) Oral daily  melatonin 3 milliGRAM(s) Oral at bedtime PRN  multivitamin 1 Tablet(s) Oral daily  ondansetron Injectable 4 milliGRAM(s) IV Push every 8 hours PRN  piperacillin/tazobactam IVPB.. 3.375 Gram(s) IV Intermittent every 8 hours  thiamine 100 milliGRAM(s) Oral daily      PMHX/PSHX:  Alcohol abuse    No significant past surgical history            ROS: 14 point ROS negative unless otherwise stated in subjective      PHYSICAL EXAM:     GENERAL:  Appears stated age, well-groomed, +chronically ill appearing  HEENT:  NC/AT, +scleral icterus  CHEST:  Full & symmetric excursion, +diminished breath sounds in RLL field; +LLL crackles; +NC  HEART:  Regular rhythm, S1, S2, no murmur/rub/S3/S4  ABDOMEN:  Soft, non-tender, +mildly-distended  EXTREMITIES: 2+ BL edema  SKIN:  +jaundice  NEURO:  Alert, orientedx3, no asterixis      Vital Signs:  Vital Signs Last 24 Hrs  T(C): 37 (21 Mar 2023 05:00), Max: 37.4 (20 Mar 2023 21:01)  T(F): 98.6 (21 Mar 2023 05:00), Max: 99.3 (20 Mar 2023 21:01)  HR: 102 (21 Mar 2023 05:00) (97 - 107)  BP: 106/67 (21 Mar 2023 05:00) (104/64 - 113/60)  BP(mean): --  RR: 18 (21 Mar 2023 05:00) (18 - 18)  SpO2: 94% (21 Mar 2023 05:00) (88% - 96%)    Parameters below as of 21 Mar 2023 05:00  Patient On (Oxygen Delivery Method): room air      Daily Height in cm: 170.18 (21 Mar 2023 01:44)    Daily Weight in k.3 (21 Mar 2023 01:44)    LABS:                        10.1   17.68 )-----------( 128      ( 20 Mar 2023 07:24 )             29.8     03-20    133<L>  |  97  |  10  ----------------------------<  93  3.1<L>   |  24  |  0.58    Ca    7.2<L>      20 Mar 2023 07:24  Phos  1.2     03-20  Mg     1.9     03-20    TPro  5.6<L>  /  Alb  2.8<L>  /  TBili  19.1<H>  /  DBili  x   /  AST  148<H>  /  ALT  49<H>  /  AlkPhos  115  03-20    LIVER FUNCTIONS - ( 20 Mar 2023 07:24 )  Alb: 2.8 g/dL / Pro: 5.6 g/dL / ALK PHOS: 115 U/L / ALT: 49 U/L / AST: 148 U/L / GGT: x           PT/INR - ( 20 Mar 2023 07:22 )   PT: 35.9 sec;   INR: 3.09 ratio         PTT - ( 19 Mar 2023 07:20 )  PTT:65.5 sec  Urinalysis Basic - ( 19 Mar 2023 06:59 )    Color: Dark Yellow / Appearance: Slightly Turbid / S.014 / pH: x  Gluc: x / Ketone: Negative  / Bili: Large >10 / Urobili: Negative   Blood: x / Protein: Trace / Nitrite: Negative   Leuk Esterase: Negative / RBC: 2 /hpf / WBC 11 /HPF   Sq Epi: x / Non Sq Epi: 3 /hpf / Bacteria: Negative          Imaging:             Chief Complaint:  Patient is a 58y old  Female who presents with a chief complaint of liver failure (20 Mar 2023 14:39)      Interval Events:   No acute complaints or overnight events.    Hospital Medications:  albumin human 25% IVPB 100 milliLiter(s) IV Intermittent every 8 hours  enoxaparin Injectable 40 milliGRAM(s) SubCutaneous every 24 hours  folic acid 1 milliGRAM(s) Oral daily  melatonin 3 milliGRAM(s) Oral at bedtime PRN  multivitamin 1 Tablet(s) Oral daily  ondansetron Injectable 4 milliGRAM(s) IV Push every 8 hours PRN  piperacillin/tazobactam IVPB.. 3.375 Gram(s) IV Intermittent every 8 hours  thiamine 100 milliGRAM(s) Oral daily      PMHX/PSHX:  Alcohol abuse    No significant past surgical history            ROS: 14 point ROS negative unless otherwise stated in subjective      PHYSICAL EXAM:     GENERAL:  Appears stated age, well-groomed, +chronically ill appearing  HEENT:  NC/AT, +scleral icterus  CHEST:  Full & symmetric excursion, +diminished breath sounds in RLL field; +LLL crackles  HEART:  Regular rhythm, S1, S2, no murmur/rub/S3/S4  ABDOMEN:  Soft, non-tender, +mildly-distended  EXTREMITIES: 2+ BL edema  SKIN:  +jaundice  NEURO:  Alert, orientedx3, no asterixis      Vital Signs:  Vital Signs Last 24 Hrs  T(C): 37 (21 Mar 2023 05:00), Max: 37.4 (20 Mar 2023 21:01)  T(F): 98.6 (21 Mar 2023 05:00), Max: 99.3 (20 Mar 2023 21:01)  HR: 102 (21 Mar 2023 05:00) (97 - 107)  BP: 106/67 (21 Mar 2023 05:00) (104/64 - 113/60)  BP(mean): --  RR: 18 (21 Mar 2023 05:00) (18 - 18)  SpO2: 94% (21 Mar 2023 05:00) (88% - 96%)    Parameters below as of 21 Mar 2023 05:00  Patient On (Oxygen Delivery Method): room air      Daily Height in cm: 170.18 (21 Mar 2023 01:44)    Daily Weight in k.3 (21 Mar 2023 01:44)    LABS:                        10.1   17.68 )-----------( 128      ( 20 Mar 2023 07:24 )             29.8     03-20    133<L>  |  97  |  10  ----------------------------<  93  3.1<L>   |  24  |  0.58    Ca    7.2<L>      20 Mar 2023 07:24  Phos  1.2     03-20  Mg     1.9     03-20    TPro  5.6<L>  /  Alb  2.8<L>  /  TBili  19.1<H>  /  DBili  x   /  AST  148<H>  /  ALT  49<H>  /  AlkPhos  115  03-20    LIVER FUNCTIONS - ( 20 Mar 2023 07:24 )  Alb: 2.8 g/dL / Pro: 5.6 g/dL / ALK PHOS: 115 U/L / ALT: 49 U/L / AST: 148 U/L / GGT: x           PT/INR - ( 20 Mar 2023 07:22 )   PT: 35.9 sec;   INR: 3.09 ratio         PTT - ( 19 Mar 2023 07:20 )  PTT:65.5 sec  Urinalysis Basic - ( 19 Mar 2023 06:59 )    Color: Dark Yellow / Appearance: Slightly Turbid / S.014 / pH: x  Gluc: x / Ketone: Negative  / Bili: Large >10 / Urobili: Negative   Blood: x / Protein: Trace / Nitrite: Negative   Leuk Esterase: Negative / RBC: 2 /hpf / WBC 11 /HPF   Sq Epi: x / Non Sq Epi: 3 /hpf / Bacteria: Negative          Imaging: No new abdominal imaging

## 2023-03-21 NOTE — DIETITIAN INITIAL EVALUATION ADULT - ORAL INTAKE PTA/DIET HISTORY
PTA per pt  -Intake: decreased PO intake x 3-4 months; monitors carbohydrate intake in setting of past family hx of DM   -Chewing/Swallowing: denies difficulty   -Allergies/Intolerances: NKFA   -Vitamins/Supplements: Vitamin D3

## 2023-03-21 NOTE — DIETITIAN INITIAL EVALUATION ADULT - NS FNS DIET ORDER
Diet, Regular:   DASH/TLC {Sodium & Cholesterol Restricted} (DASH)  Low Sodium (03-16-23 @ 04:57) [Active]

## 2023-03-21 NOTE — DIETITIAN INITIAL EVALUATION ADULT - PERTINENT MEDS FT
MEDICATIONS  (STANDING):  albumin human 25% IVPB 100 milliLiter(s) IV Intermittent every 8 hours  enoxaparin Injectable 40 milliGRAM(s) SubCutaneous every 24 hours  folic acid 1 milliGRAM(s) Oral daily  multivitamin 1 Tablet(s) Oral daily  piperacillin/tazobactam IVPB.. 3.375 Gram(s) IV Intermittent every 8 hours  potassium chloride    Tablet ER 20 milliEquivalent(s) Oral every 2 hours  thiamine 100 milliGRAM(s) Oral daily    MEDICATIONS  (PRN):  melatonin 3 milliGRAM(s) Oral at bedtime PRN Insomnia  ondansetron Injectable 4 milliGRAM(s) IV Push every 8 hours PRN Nausea and/or Vomiting

## 2023-03-21 NOTE — DIETITIAN INITIAL EVALUATION ADULT - REASON INDICATOR FOR ASSESSMENT
Consult: liver transplant evaluation   Sources: Electronic Medical Record, Team (Rounds), Patient, Spouse at bedside

## 2023-03-21 NOTE — DIETITIAN INITIAL EVALUATION ADULT - OTHER INFO
GI/Intake:   -Per flowsheet, 26-75% intake of meals   -Agreeable to PO supplements   -Last BM reported 3/21; no bowel regimen ordered   -Decompensated ETOH cirrhosis with ascites   -S/p paracentesis 3/17 - 3.86L removed    Renal:   -Albumin ordered   -Hypokalemic - KCl ordered, hypophosphatemic - replete PRN     Weight Hx:  -Current dosin pounds   -Per pt, UBW: 178 pounds x 3 months ago    LIVER TRANSPLANT EVALUATION:   -Pt resides Spouse; able to assist with meals post-transplant    -BMI: 24.3 - normal   -HbA1c: 4.7% - no DM detected   -Frailty: 4.9 - frail (3/20)   -MELD: 30 (3/21)     Education:    -Reviewed post-transplant nutrition therapy and food safety guidelines for transplant recipients   -Reviewed recommendations to avoid grapefruit, pomegranate and star fruit while taking immunosuppressant medication.    -Reviewed recommendations for moderate intake of sodium and carbohydrates with transplant medications.      Pt was receptive and expressed understanding.     Pt with no known nutrition contraindication to transplant. Concern regarding malnutrition and frailty status; will optimize nutrition via PO supplements and diet     Nutrition assessment communicated with Transplant Hepatology Team and outpatient Transplant RDs

## 2023-03-21 NOTE — DIETITIAN INITIAL EVALUATION ADULT - PERSON TAUGHT/METHOD
Provided brief introduction to education on post transplant nutrition therapy and food safety guidelines for transplant recipients. Discussed needs to follow food safety guidelines with medications, increased needs for proper post-surgical healing, and moderate intake of sodium and carbohydrates; pt made aware detailed education with written material will be provided post-transplant. Pt amenable - denies having questions/concerns about diet and nutrition at this time; made aware RD remains available./verbal instruction/patient instructed/spouse instructed

## 2023-03-21 NOTE — PROVIDER CONTACT NOTE (CRITICAL VALUE NOTIFICATION) - BACKGROUND
Pt admitted for liver failure, liver transplant eval.
Pt admit dx: other ascites
Patient came in with a diagnosis of ascites.

## 2023-03-21 NOTE — PROVIDER CONTACT NOTE (CRITICAL VALUE NOTIFICATION) - SITUATION
Patient had a critical of lactate 4.3.
Phosphorus 0.9
Lab called to report critical value result -- Lactate = 7.4

## 2023-03-21 NOTE — DIETITIAN INITIAL EVALUATION ADULT - PERTINENT LABORATORY DATA
03-21    136  |  100  |  5<L>  ----------------------------<  112<H>  3.2<L>   |  24  |  0.39<L>    Ca    7.5<L>      21 Mar 2023 06:45  Phos  0.9     03-21  Mg     2.0     03-21    TPro  5.6<L>  /  Alb  3.0<L>  /  TBili  19.5<H>  /  DBili  x   /  AST  118<H>  /  ALT  44  /  AlkPhos  111  03-21  A1C with Estimated Average Glucose Result: 4.7 % (03-18-23 @ 07:30)

## 2023-03-21 NOTE — DIETITIAN INITIAL EVALUATION ADULT - SIGNS/SYMPTOMS
as evidenced by <75% intake x >/3 months; >11% weight loss x 3 months  as evidenced by ETOH cirrhosis

## 2023-03-21 NOTE — DIETITIAN INITIAL EVALUATION ADULT - ETIOLOGY
related to inadequate protein-energy intake in the setting of ETOH liver cirrhosis  related to increased physiological demand

## 2023-03-21 NOTE — PROVIDER CONTACT NOTE (CRITICAL VALUE NOTIFICATION) - ACTION/TREATMENT ORDERED:
provider notified, phosphorus IVPB ordered and given.
repeat lactate 4.3 at 1500
LILIAN Burgos to order albumin followed by bolus

## 2023-03-21 NOTE — PROGRESS NOTE ADULT - ASSESSMENT
58 year old English and Polish speaking F w/ AUD presented to [facility] on 3/13/23 for 2 weeks of worsening generalized weakness in setting of lower extremity edema yellowing of the eyes, and ascites and was found to have elevated liver enzymes as well as CT findings concerning for Budd-chiarri. She was treated with ABX for possible UTI and SBP prophylaxis and  transferred to [facility] for further evaluation on 3/16. History obtained from pt and her daughter. Over the last 6 months has been drinking more alcohol at home (mostly vodka). Had been drinking ETOH before, after and while at work. Works as a ; last worked 2022. She lives with her  who also drinks (but reportedly drinks less that the pt). Pt has 2 children- one daughter who lives in [place] and a son in [place]. Pt went to [facility] ETOH rehab (group and individual therapy) starting in 2023 but has still been drinking despite therapy. Last drink was 3/9/23.      #AUD- MDF 94 (poor prognosis); consented for LT eval (3/17), not yet listed; UNOS/OPTN MELDNa 31 (3/20); ABO: O.   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   -s/p para with 3.9 L removed, neg for SBP; SAAG > 1.1.   -US venous duplex (3/16) BL LE neg for DVT.  -US abd doppler (3/17) - Reversal of flow in the main and right portal veins, c/w portal hypertension. Portosystemic collateral formation as evidenced by a recannalized umbilical vein, which is in keeping with findings of portal hypertension. No evidence of thrombus within the IVC, main, left, and right hepatic veins. Patent hepatic arteries.  -transplant workup: normal A1AT; normal ceruloplasmin; (-)HAV IgM; (-)HBV sAg; (-)HBV cAb IgM; (-)HBV DNA PCR; (-)HCV Ab, (-)MIKO neg, (+)Ig; (+)IgA: 1070; (-)IgM; (-)AMA, (-)ASMA; (-)LKM Ab; (+)VZV IgG; (+)mumps IgG; (+)measles IgG; (+)rubella IgG; (-)toxoplasma IgG; (-)syphilis screen; (-)HIV Ab         -LT candidacy and evaluation:            [x] Blood type: O            [ ] Psychosocial            [ ] Infectious            [ ] Cardiology:        TTE (3/16) - showed bileaflet mitral valve prolapse with moderate MR, EF 60-65%.                    Cardiac cath:                    Stress test:             [ ] Colonoscopy:             [ ] Mammography:             [ ] Pap smear:             [x] Dental: not needed per pt's insurance            [ ] PT/Frailty    #Possible sepsis  Has persistent fevers with leukocytosis, no infectious source identified.   -Infectious work up: CXR () worsening L basilar infiltrate, UA neg. bl cx x2 (3/17, 3/18) NGTD. Ascitic fluid cx (3/17) NGTD.    Recs:  - c/w Zosyn (3/18 ->)  - c/w MVI/thiamine/folic acid  - IV lasix 40 x1 today  - Albumin 100 cc 25% q8h (3/19-3/22)  - f/u PETH   - please consult nutrition/PT/BH/SW   - trend clinical symptoms, exam findings, vital signs, CBC, CMP, INR, Mg, phosphorus  - rule out viral hepatitis with EBV PCR, CMV IgG & PCR; HEV Ab  - check RVP (daughter recently tested positive for COVID)  - Social work evaluation +/- psychiatry eval as needed  - Infectious disease consultation       -f/u quantiferon gold for TB screening  - Cardiology consultation  - Recommend transferring to [address] [place] [address] for LT eval when bed is available  - will eventually need EGD +/- when respiratory status is optimized    Bundle:  DVT ppx: Lovenox  Diet: low sodium  NOK: Aiden Reyes () [phone]; Amisha Parker (daughter)- [phone]  IV access: PIV  Dispo: TBD  Code status: full code      **THIS NOTE IS NOT FINALIZED UNTIL SIGNED BY THE ATTENDING**    Chely Randall MD  GI Fellow, PGY-5  Available via [employer] Teams    NON-URGENT CONSULTS:  Please email [email or web address]  AT NIGHT AND ON WEEKENDS:  Contact on-call GI fellow via answering service ([phone]) from 5pm-8am and on weekends/holidays  MONDAY-FRIDAY 8AM-5PM:  Pager# [phone] ([facility]) or [phone] ([facility])  GI Phone# [phone] ([facility])   58 year old English and Polish speaking F w/ AUD presented to [facility] on 3/13/23 for 2 weeks of worsening generalized weakness in setting of lower extremity edema yellowing of the eyes, and ascites and was found to have elevated liver enzymes as well as CT findings concerning for Budd-chiarri. She was treated with ABX for possible UTI and SBP prophylaxis and  transferred to [facility] for further evaluation on 3/16. History obtained from pt and her daughter. Over the last 6 months has been drinking more alcohol at home (mostly vodka). Had been drinking ETOH before, after and while at work. Works as a ; last worked 2022. She lives with her  who also drinks (but reportedly drinks less that the pt). Pt has 2 children- one daughter who lives in [place] and a son in [place]. Pt went to [facility] ETOH rehab (group and individual therapy) starting in 2023 but has still been drinking despite therapy. Last drink was 3/9/23.      #AUD- MDF 94 (poor prognosis); consented for LT eval (3/17), not yet listed; UNOS/OPTN MELDNa 30 (3/21); ABO: O.   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   -s/p para with 3.9 L removed, neg for SBP; SAAG > 1.1.   -US venous duplex (3/16) BL LE neg for DVT.  -US abd doppler (3/17) - Reversal of flow in the main and right portal veins, c/w portal hypertension. Portosystemic collateral formation as evidenced by a recannalized umbilical vein, which is in keeping with findings of portal hypertension. No evidence of thrombus within the IVC, main, left, and right hepatic veins. Patent hepatic arteries.  -transplant workup: normal A1AT; normal ceruloplasmin; (-)HAV IgM; (-)HBV sAg; (-)HBV cAb IgM; (-)HBV DNA PCR; (-)HCV Ab, (-)MIKO neg, (+)Ig; (+)IgA: 1070; (-)IgM; (-)AMA, (-)ASMA; (-)LKM Ab; (+)VZV IgG; (+)mumps IgG; (+)measles IgG; (+)rubella IgG; (-)toxoplasma IgG; (-)syphilis screen; (-)HIV Ab; (-)EBV PCR; (-)CMV PCR         -LT candidacy and evaluation:            [x] Blood type: O            [ ] Psychosocial            [ ] Infectious            [ ] Cardiology:        TTE (3/16) - showed bileaflet mitral valve prolapse with moderate MR, EF 60-65%.                    Cardiac cath:                    Stress test:             [ ] Colonoscopy:             [ ] Mammography:             [ ] Pap smear:             [x] Dental: not needed per pt's insurance            [x] PT/Frailty: Frail- score 4.9 (3/20)    #Possible sepsis  Has persistent fevers with leukocytosis, no infectious source identified.   -Infectious work up: CXR () worsening L basilar infiltrate, UA neg. bl cx x2 (3/17, 3/18) NGTD. Ascitic fluid cx (3/17) NGTD.    Recs:  - c/w Zosyn (3/18 ->)  - c/w MVI/thiamine/folic acid  - IV lasix 80 x1 today  - Albumin 100 cc 25% q8h (3/19-3/22)  - f/u PETH   - check HEV Ab  - please consult nutrition/PT/BH/SW   - trend clinical symptoms, exam findings, vital signs, CBC, CMP, INR, Mg, phosphorus  - consult GYN for pap  - order breast US  - Social work evaluation +/- psychiatry eval as needed  - Infectious disease consultation       -f/u quantiferon gold for TB screening  - Cardiology consultation- s/p TTE will need cards input if DSE is needed  - will eventually need EGD +/- colon when respiratory status is optimized    Bundle:  DVT ppx: Lovenox  Diet: low sodium  NOK: Aiden Reyes () [phone]; Amisha Parker (daughter)- [phone]  IV access: PIV  Dispo: TBD  Code status: full code      **THIS NOTE IS NOT FINALIZED UNTIL SIGNED BY THE ATTENDING**    Chely Randall MD  GI Fellow, PGY-5  Available via [employer] Teams    NON-URGENT CONSULTS:  Please email [email or web address]  AT NIGHT AND ON WEEKENDS:  Contact on-call GI fellow via answering service ([phone]) from 5pm-8am and on weekends/holidays  MONDAY-FRIDAY 8AM-5PM:  Pager# [phone] ([facility]) or [phone] ([facility])  GI Phone# [phone] ([facility])

## 2023-03-21 NOTE — DIETITIAN INITIAL EVALUATION ADULT - REASON FOR ADMISSION
"58 year old English and Polish speaking F w/ AUD presented to [facility] on 3/13/23 for 2 weeks of worsening generalized weakness in setting of lower extremity edema yellowing of the eyes, and ascites and was found to have elevated liver enzymes as well as CT findings concerning for Budd-chiarri. She was treated with ABX for possible UTI and SBP prophylaxis and  transferred to [facility] for further evaluation on 3/16. History obtained from pt and her daughter. Over the last 6 months has been drinking more alcohol at home (mostly vodka). Had been drinking ETOH before, after and while at work. Works as a ; last worked 12/2022. She lives with her  who also drinks (but reportedly drinks less that the pt). Pt has 2 children- one daughter who lives in [place] and a son in [place]. Pt went to [facility] ETOH rehab (group and individual therapy) starting in 01/2023 but has still been drinking despite therapy. Last drink was 3/9/23."

## 2023-03-21 NOTE — DIETITIAN INITIAL EVALUATION ADULT - ADD RECOMMEND
1) Liberalize to regular, low sodium diet   2) Add Ensure Plus High Protein BID   3) Continue multivitamin, folic acid, thiamine daily   4) Monitor PO intake, diet tolerance, weight trends, labs, GI function, and skin integrity  5) Malnutrition sticker placed     Talia Ayers MS, RDN, CDN (Teams/Pager #[phone])

## 2023-03-21 NOTE — PROGRESS NOTE ADULT - SUBJECTIVE AND OBJECTIVE BOX
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 She was treated with ABX for possible UTI and SBP prophylaxis and  transferred to [facility] for further evaluation on 3/16. History obtained from pt and her daughter. Over the last 6 months has been drinking more alcohol at home (mostly vodka). Had been drinking ETOH before, after and while at work. Works as a ; last worked 12/2022. She lives with her  who also drinks (but reportedly drinks less that the pt). Pt has 2 children- one daughter who lives in [place] and a son in [place]. Pt went to [facility]H rehab (group and individual therapy) starting in 01/2023 but has still been drinking despite therapy. Last drink was 3/9/23." (21 Mar 2023 13:43)      INTERVAL HISTORY: Feels ok.     REVIEW OF SYSTEMS:  CONSTITUTIONAL: No weakness  EYES/ENT: No visual changes;  No throat pain   NECK: No pain or stiffness  RESPIRATORY: No cough, wheezing; No shortness of breath  CARDIOVASCULAR: No chest pain or palpitations  GASTROINTESTINAL: No abdominal  pain. No nausea, vomiting, or hematemesis  GENITOURINARY: No dysuria, frequency or hematuria  NEUROLOGICAL: No stroke like symptoms  SKIN: No rashes    	  MEDICATIONS:        PHYSICAL EXAM:  T(C): 36.9 (03-21-23 @ 13:00), Max: 37.4 (03-20-23 @ 21:01)  HR: 91 (03-21-23 @ 13:00) (91 - 107)  BP: 117/78 (03-21-23 @ 13:00) (106/67 - 123/76)  RR: 18 (03-21-23 @ 13:00) (18 - 18)  SpO2: 94% (03-21-23 @ 13:00) (88% - 96%)  Wt(kg): --  I&O's Summary    20 Mar 2023 07:01  -  21 Mar 2023 07:00  --------------------------------------------------------  IN: 980 mL / OUT: 200 mL / NET: 780 mL    21 Mar 2023 07:01  -  21 Mar 2023 14:51  --------------------------------------------------------  IN: 720 mL / OUT: 350 mL / NET: 370 mL      Height (cm): 170.2 (03-21 @ 01:44)  Weight (kg): 70.3 (03-21 @ 01:44)  BMI (kg/m2): 24.3 (03-21 @ 01:44)  BSA (m2): 1.81 (03-21 @ 01:44)    Appearance: In no distress	  HEENT:    PERRL, EOMI	  Cardiovascular:  S1 S2, No JVD  Respiratory: Lungs clear to auscultation	  Gastrointestinal:  Soft, Non-tender, + BS	  Vascularature:  + edema of LE  Psychiatric: Appropriate affect   Neuro: no acute focal deficits                               10.4   13.75 )-----------( 123      ( 21 Mar 2023 06:44 )             30.6     03-21    136  |  100  |  5<L>  ----------------------------<  112<H>  3.2<L>   |  24  |  0.39<L>    Ca    7.5<L>      21 Mar 2023 06:45  Phos  0.9     03-21  Mg     2.0     03-21    TPro  5.6<L>  /  Alb  3.0<L>  /  TBili  19.5<H>  /  DBili  x   /  AST  118<H>  /  ALT  44  /  AlkPhos  111  03-21        Labs personally reviewed      ASSESSMENT/PLAN: 	      58F EtOH abuse,  transferred from [facility]  for acute liver failure.     Problem/Plan - 1:  ·  Problem: Lower extremity edema.  ·  Plan: Likely 2/2 portal hypertension, less likely 2/2 HF  Echocardiogram. 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  ·  Plan: 2/2 to pleural effusions   Diuresis as noted above with PO Aldactone and Lasix  wean 02 to maintain >92%, currently on 2.5L saturating well.    Problem/Plan - 3:  ·  Problem: Need for prophylactic measure.   ·  Plan: lovenox 40.        MARU Holliday-NP   Jeffrey Melo DO [facility]  Cardiovascular Medicine  [address] [place] [address], [address]  Available through call or text on [employer]  Office: [phone]

## 2023-03-22 DIAGNOSIS — J15.6 PNEUMONIA DUE TO OTHER GRAM-NEGATIVE BACTERIA: ICD-10-CM

## 2023-03-22 DIAGNOSIS — E87.8 OTHER DISORDERS OF ELECTROLYTE AND FLUID BALANCE, NOT ELSEWHERE CLASSIFIED: ICD-10-CM

## 2023-03-22 LAB
ALBUMIN SERPL ELPH-MCNC: 3.8 G/DL — SIGNIFICANT CHANGE UP (ref 3.3–5)
ALP SERPL-CCNC: 94 U/L — SIGNIFICANT CHANGE UP (ref 40–120)
ALT FLD-CCNC: 36 U/L — SIGNIFICANT CHANGE UP (ref 10–45)
ANION GAP SERPL CALC-SCNC: 13 MMOL/L — SIGNIFICANT CHANGE UP (ref 5–17)
AST SERPL-CCNC: 104 U/L — HIGH (ref 10–40)
BILIRUB SERPL-MCNC: 24.9 MG/DL — HIGH (ref 0.2–1.2)
BUN SERPL-MCNC: 5 MG/DL — LOW (ref 7–23)
CALCIUM SERPL-MCNC: 8.4 MG/DL — SIGNIFICANT CHANGE UP (ref 8.4–10.5)
CHLORIDE SERPL-SCNC: 103 MMOL/L — SIGNIFICANT CHANGE UP (ref 96–108)
CO2 SERPL-SCNC: 21 MMOL/L — LOW (ref 22–31)
CREAT SERPL-MCNC: 0.3 MG/DL — LOW (ref 0.5–1.3)
CULTURE RESULTS: SIGNIFICANT CHANGE UP
EGFR: 124 ML/MIN/1.73M2 — SIGNIFICANT CHANGE UP
GLUCOSE SERPL-MCNC: 118 MG/DL — HIGH (ref 70–99)
HCT VFR BLD CALC: 30.2 % — LOW (ref 34.5–45)
HGB BLD-MCNC: 10.5 G/DL — LOW (ref 11.5–15.5)
MAGNESIUM SERPL-MCNC: 1.9 MG/DL — SIGNIFICANT CHANGE UP (ref 1.6–2.6)
MCHC RBC-ENTMCNC: 34.8 GM/DL — SIGNIFICANT CHANGE UP (ref 32–36)
MCHC RBC-ENTMCNC: 36.7 PG — HIGH (ref 27–34)
MCV RBC AUTO: 105.6 FL — HIGH (ref 80–100)
NRBC # BLD: 0 /100 WBCS — SIGNIFICANT CHANGE UP (ref 0–0)
PHOSPHATE SERPL-MCNC: 1.6 MG/DL — LOW (ref 2.5–4.5)
PLATELET # BLD AUTO: 108 K/UL — LOW (ref 150–400)
POTASSIUM SERPL-MCNC: 4.3 MMOL/L — SIGNIFICANT CHANGE UP (ref 3.5–5.3)
POTASSIUM SERPL-SCNC: 4.3 MMOL/L — SIGNIFICANT CHANGE UP (ref 3.5–5.3)
PROT SERPL-MCNC: 6 G/DL — SIGNIFICANT CHANGE UP (ref 6–8.3)
RBC # BLD: 2.86 M/UL — LOW (ref 3.8–5.2)
RBC # FLD: 15.7 % — HIGH (ref 10.3–14.5)
SODIUM SERPL-SCNC: 137 MMOL/L — SIGNIFICANT CHANGE UP (ref 135–145)
SPECIMEN SOURCE: SIGNIFICANT CHANGE UP
WBC # BLD: 16.27 K/UL — HIGH (ref 3.8–10.5)
WBC # FLD AUTO: 16.27 K/UL — HIGH (ref 3.8–10.5)

## 2023-03-22 PROCEDURE — 99232 SBSQ HOSP IP/OBS MODERATE 35: CPT | Mod: GC

## 2023-03-22 PROCEDURE — 99233 SBSQ HOSP IP/OBS HIGH 50: CPT

## 2023-03-22 RX ORDER — PHYTONADIONE (VIT K1) 5 MG
10 TABLET ORAL DAILY
Refills: 0 | Status: COMPLETED | OUTPATIENT
Start: 2023-03-22 | End: 2023-03-24

## 2023-03-22 RX ORDER — POTASSIUM PHOSPHATE, MONOBASIC POTASSIUM PHOSPHATE, DIBASIC 236; 224 MG/ML; MG/ML
30 INJECTION, SOLUTION INTRAVENOUS ONCE
Refills: 0 | Status: COMPLETED | OUTPATIENT
Start: 2023-03-22 | End: 2023-03-22

## 2023-03-22 RX ORDER — SODIUM,POTASSIUM PHOSPHATES 278-250MG
2 POWDER IN PACKET (EA) ORAL EVERY 6 HOURS
Refills: 0 | Status: COMPLETED | OUTPATIENT
Start: 2023-03-22 | End: 2023-03-23

## 2023-03-22 RX ADMIN — Medication 100 MILLIGRAM(S): at 14:01

## 2023-03-22 RX ADMIN — POTASSIUM PHOSPHATE, MONOBASIC POTASSIUM PHOSPHATE, DIBASIC 83.33 MILLIMOLE(S): 236; 224 INJECTION, SOLUTION INTRAVENOUS at 10:11

## 2023-03-22 RX ADMIN — PIPERACILLIN AND TAZOBACTAM 25 GRAM(S): 4; .5 INJECTION, POWDER, LYOPHILIZED, FOR SOLUTION INTRAVENOUS at 22:09

## 2023-03-22 RX ADMIN — PIPERACILLIN AND TAZOBACTAM 25 GRAM(S): 4; .5 INJECTION, POWDER, LYOPHILIZED, FOR SOLUTION INTRAVENOUS at 06:12

## 2023-03-22 RX ADMIN — Medication 50 MILLILITER(S): at 07:58

## 2023-03-22 RX ADMIN — Medication 102 MILLIGRAM(S): at 22:08

## 2023-03-22 RX ADMIN — PIPERACILLIN AND TAZOBACTAM 25 GRAM(S): 4; .5 INJECTION, POWDER, LYOPHILIZED, FOR SOLUTION INTRAVENOUS at 14:00

## 2023-03-22 RX ADMIN — Medication 1 MILLIGRAM(S): at 14:01

## 2023-03-22 RX ADMIN — Medication 2 TABLET(S): at 22:04

## 2023-03-22 RX ADMIN — Medication 1 TABLET(S): at 14:01

## 2023-03-22 RX ADMIN — Medication 50 MILLILITER(S): at 00:31

## 2023-03-22 NOTE — DISCHARGE NOTE PROVIDER - CARE PROVIDER_API CALL
[facility],   Phone: (   )    -  Fax: (   )    -  Follow Up Time:

## 2023-03-22 NOTE — PROGRESS NOTE ADULT - SUBJECTIVE AND OBJECTIVE BOX
DATE OF SERVICE: 03-22-23 @ 14:20    Patient is a 58y old  Female who presents with a chief complaint of liver failure (22 Mar 2023 11:19)      INTERVAL HISTORY: Feels ok.     REVIEW OF SYSTEMS:  CONSTITUTIONAL: No weakness  EYES/ENT: No visual changes;  No throat pain   NECK: No pain or stiffness  RESPIRATORY: No cough, wheezing; No shortness of breath  CARDIOVASCULAR: No chest pain or palpitations  GASTROINTESTINAL: No abdominal  pain. No nausea, vomiting, or hematemesis  GENITOURINARY: No dysuria, frequency or hematuria  NEUROLOGICAL: No stroke like symptoms  SKIN: No rashes    MEDICATIONS:        PHYSICAL EXAM:  T(C): 36.9 (03-22-23 @ 12:16), Max: 37.3 (03-21-23 @ 21:35)  HR: 98 (03-22-23 @ 12:16) (91 - 99)  BP: 103/62 (03-22-23 @ 12:16) (103/62 - 119/75)  RR: 18 (03-22-23 @ 12:16) (18 - 18)  SpO2: 96% (03-22-23 @ 12:16) (92% - 96%)  Wt(kg): --  I&O's Summary    21 Mar 2023 07:01  -  22 Mar 2023 07:00  --------------------------------------------------------  IN: 2140 mL / OUT: 650 mL / NET: 1490 mL    22 Mar 2023 07:01  -  22 Mar 2023 14:20  --------------------------------------------------------  IN: 480 mL / OUT: 0 mL / NET: 480 mL          Appearance: In no distress, jaundiced	  HEENT:    PERRL, EOMI	  Cardiovascular:  S1 S2, No JVD  Respiratory: Lungs clear to auscultation	  Gastrointestinal:  Soft, Non-tender, + BS	  Vascularature:  + edema of LE  Psychiatric: Appropriate affect   Neuro: no acute focal deficits                               10.4   13.75 )-----------( 123      ( 21 Mar 2023 06:44 )             30.6     03-22    137  |  103  |  5<L>  ----------------------------<  118<H>  4.3   |  21<L>  |  0.30<L>    Ca    8.4      22 Mar 2023 13:29  Phos  1.6     03-22  Mg     1.9     03-22    TPro  6.0  /  Alb  3.8  /  TBili  24.9<H>  /  DBili  x   /  AST  104<H>  /  ALT  36  /  AlkPhos  94  03-22        Labs personally reviewed      ASSESSMENT/PLAN: 	      58F EtOH abuse,  transferred from [facility]  for acute liver failure.     Problem/Plan - 1:  ·  Problem: Lower extremity edema.  ·  Plan: Likely 2/2 portal hypertension, less likely 2/2 HF  Echocardiogram. 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  Family considering transfer to [facility] for further Liver TP workup.     Problem/Plan - 2:  ·  Problem: Acute hypoxemic respiratory failure.  ·  Plan: 2/2 to pleural effusions   Diuresis as noted above with PO Aldactone and Lasix  wean 02 to maintain >92%, currently on 2.5L saturating well.    Problem/Plan - 3:  ·  Problem: Need for prophylactic measure.   ·  Plan: lovenox 40.          Shannan Hilton, MARU-NP   Jeffrey Melo DO [facility]  Cardiovascular Medicine  [address] [place] [address], [address]  Available through call or text on [employer] TE[employer]  Office: [phone]

## 2023-03-22 NOTE — DISCHARGE NOTE PROVIDER - DETAILS OF MALNUTRITION DIAGNOSIS/DIAGNOSES
This patient has been assessed with a concern for Malnutrition and was treated during this hospitalization for the following Nutrition diagnosis/diagnoses:     -  03/21/2023: Severe protein-calorie malnutrition

## 2023-03-22 NOTE — DISCHARGE NOTE PROVIDER - DISCHARGE DATE
1. Have you had increased asthma symptoms (chest tightness, increased cough,         wheezing or felt short of breath) in the past week? No    2. Have you had a marked increase in allergy symptoms(itchy eyes or nose, sneezing, runny nose, post nasal drip or throat clearing) in the past week? No    3. Do you have a cold, respiratory tract infection, or flu-like symptoms? No    4. Did you have any problems such as increased allergy or asthma symptoms, hives or generalized itching within 12 hours of receiving your allergy injection or swelling that persisted into the next day? No    5. Are you on any new medications such as eye drops, blood pressure or migraine medication? No    Please specify: NA    6. Patient was seen by allergist in the last 12 months? Yes    7. Are you taking your allergy medicine as prescribed? Yes    8. Do you have your Epi kit with you? Yes    9.  Epi expiration date: 2/28/21     Staff notes:  Andi Latif to proceed with injection 23-Mar-2023

## 2023-03-22 NOTE — DISCHARGE NOTE PROVIDER - NSDCCPCAREPLAN_GEN_ALL_CORE_FT
PRINCIPAL DISCHARGE DIAGNOSIS  Diagnosis: Alcoholic cirrhosis  Assessment and Plan of Treatment: Contact our Transplant clinic at [phone], return to our Emergency Department or NOTIFY YOUR PROVIDER IF:  You have any bleeding that does not stop, any fever (over 100.4 F) or chills, persistent nausea/vomiting with inability to tolerate food or liquids, persistent diarrhea, and/or if your pain is not controlled on your discharge pain medications, jaundice, confusion, any signs of bleeding, any urinary changes.  Continue to maintain sobriety and participate in all alcohol cessation initiatives set in place by the transplant team. Persistent alcohol use will conitnue to harm your liver, and cause permanent damage.      SECONDARY DISCHARGE DIAGNOSES  Diagnosis: Systemic inflammatory response syndrome (SIRS)  Assessment and Plan of Treatment: Febrile on 3/18, blood, urine and paracentesis cultures with NGTD     PRINCIPAL DISCHARGE DIAGNOSIS  Diagnosis: Alcoholic cirrhosis  Assessment and Plan of Treatment: AUD- MDF 94 (poor prognosis);  ; ABO: O.   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   -s/p para with 3.9 L removed, neg for SBP; SAAG > 1.1.   -US venous duplex (3/16) BL LE neg for DVT.  -US abd doppler (3/17) - Reversal of flow in the main and right portal veins, c/w portal hypertension. Portosystemic collateral formation as evidenced by a recannalized umbilical vein, which is in keeping with findings of portal hypertension. No evidence of thrombus within the IVC, main, left, and right hepatic veins. Patent hepatic arteries.  -transplant workup: normal A1AT; normal ceruloplasmin; (-)HAV IgM; (-)HBV sAg; (-)HBV cAb IgM; (-)HBV DNA PCR; (-)HCV Ab, (-)MIKO neg, (+)Ig; (+)IgA: 1070; (-)IgM; (-)AMA, (-)ASMA; (-)LKM Ab; (+)VZV IgG; (+)mumps IgG; (+)measles IgG; (+)rubella IgG; (-)toxoplasma IgG; (-)syphilis screen; (-)HIV Ab; (-)EBV PCR; (-)CMV PCR       -LT candidacy and evaluation:            [x] Blood type: O            [ ] Psychosocial            [ ] Infectious            [ ] Cardiology:        TTE (3/16) - showed bileaflet mitral valve prolapse with moderate MR, EF 60-65%.                    Cardiac cath:                    Stress test:             [ ] Colonoscopy:             [ ] Mammography:             [ ] Pap smear:             [x] Dental: not needed per pt's insurance            [x] PT/Frailty: Frail- score 4.9 (3/20)      SECONDARY DISCHARGE DIAGNOSES  Diagnosis: Systemic inflammatory response syndrome (SIRS)  Assessment and Plan of Treatment: Treating for pneumnia for 5 days ending 3/25

## 2023-03-22 NOTE — PROGRESS NOTE ADULT - SUBJECTIVE AND OBJECTIVE BOX
PROGRESS NOTE:   Prabha Dias DO  Hospitalist  Pager [phone]  After 5pm/weekends or if no answer ext: [phone]      Patient is a 58y old  Female who presents with a chief complaint of liver failure (22 Mar 2023 00:05)      SUBJECTIVE / OVERNIGHT EVENTS: She is feeling ok this am. No acute events.     ADDITIONAL REVIEW OF SYSTEMS:  no fever or chills  no n/v/d    MEDICATIONS  (STANDING):  enoxaparin Injectable 40 milliGRAM(s) SubCutaneous every 24 hours  folic acid 1 milliGRAM(s) Oral daily  multivitamin 1 Tablet(s) Oral daily  piperacillin/tazobactam IVPB.. 3.375 Gram(s) IV Intermittent every 8 hours  thiamine 100 milliGRAM(s) Oral daily    MEDICATIONS  (PRN):  melatonin 3 milliGRAM(s) Oral at bedtime PRN Insomnia  ondansetron Injectable 4 milliGRAM(s) IV Push every 8 hours PRN Nausea and/or Vomiting      CAPILLARY BLOOD GLUCOSE        I&O's Summary    21 Mar 2023 07:01  -  22 Mar 2023 07:00  --------------------------------------------------------  IN: 2140 mL / OUT: 650 mL / NET: 1490 mL    22 Mar 2023 07:01  -  22 Mar 2023 11:20  --------------------------------------------------------  IN: 240 mL / OUT: 0 mL / NET: 240 mL        PHYSICAL EXAM:  Vital Signs Last 24 Hrs  T(C): 37.3 (22 Mar 2023 04:26), Max: 37.3 (21 Mar 2023 21:35)  T(F): 99.1 (22 Mar 2023 04:26), Max: 99.2 (21 Mar 2023 21:35)  HR: 95 (22 Mar 2023 04:26) (91 - 99)  BP: 109/69 (22 Mar 2023 04:26) (106/62 - 119/75)  BP(mean): --  RR: 18 (22 Mar 2023 05:00) (18 - 18)  SpO2: 93% (22 Mar 2023 05:00) (92% - 95%)    Parameters below as of 22 Mar 2023 05:00  Patient On (Oxygen Delivery Method): room air        CONSTITUTIONAL: NAD, well-developed, jaundice non toxic appearing, awake and alert and answering all questions  RESPIRATORY: Normal respiratory effort; lungs are clear to auscultation bilaterally  CARDIOVASCULAR: Regular rate and rhythm, normal S1 and S2, no murmur/rub/gallop; No lower extremity edema; Peripheral pulses are 2+ bilaterally  ABDOMEN: Nontender to palpation, normoactive bowel sounds, no rebound/guarding; No hepatosplenomegaly  MUSCLOSKELETAL: no clubbing or cyanosis of digits; no joint swelling or tenderness to palpation  PSYCH: A+O to person, place, and time; affect appropriate    LABS:                        10.4   13.75 )-----------( 123      ( 21 Mar 2023 06:44 )             30.6     03-21    136  |  99  |  5<L>  ----------------------------<  107<H>  3.5   |  22  |  0.36<L>    Ca    7.8<L>      21 Mar 2023 17:44  Phos  1.7     03-21  Mg     1.9     03-21    TPro  5.6<L>  /  Alb  3.0<L>  /  TBili  19.5<H>  /  DBili  x   /  AST  118<H>  /  ALT  44  /  AlkPhos  111  03-21    PT/INR - ( 21 Mar 2023 06:44 )   PT: 35.3 sec;   INR: 3.04 ratio                     RADIOLOGY & ADDITIONAL TESTS:  Results Reviewed:   Imaging Personally Reviewed:  Electrocardiogram Personally Reviewed:    COORDINATION OF CARE:  Care Discussed with Consultants/Other Providers [Y/N]:  Prior or Outpatient Records Reviewed [Y/N]:

## 2023-03-22 NOTE — PROGRESS NOTE ADULT - PROBLEM SELECTOR PLAN 3
ETOH suspected but other serologies pending, budd chiari negative on US  -s/p para with 3.9 L removed, neg for SBP; SAAG > 1.1.   -US venous duplex (3/16) BL LE neg for DVT.  -workup: (-)HAV IgM; (-)HBV sAg; (-)HBV cAb IgM; (-)HCV Ab, MIKO neg, Alpha 1 antitrypsin neg,   - Blood type: O negative.    - TTE obtained on 3/16 - showed bileaflet mitral valve prolapse with moderate MR, EF 60-65%.  - Pending transfer to [facility] for transplant evaluation, insurance won't cover here.   Still needs: Cards eval, PT/BH/SW, EGD/Colon

## 2023-03-22 NOTE — DISCHARGE NOTE PROVIDER - HOSPITAL COURSE
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     Underwent diagnostic/therapeutic paracentesis with 3860cc removed, cell count 68 on 3/17/23. US doppler completed with portal hypertension without evidence of thrombus. Noted on 3/18/23 to be febrile up to 101.1F rectally along with lactic acidosis, cultures sent, lactate cleared (7.4 --> 2.7). Urine, paracentesis and blood cultures remained with NGTD. Liver transplant evaluation was opened on 3/17/23, however patients insurance is not compatible with [facility] transplant. Evaluation halted. [place] transplant team consulted for possible transfer. 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     Underwent diagnostic/therapeutic paracentesis with 3860cc removed, cell count 68 on 3/17/23. US doppler completed with portal hypertension without evidence of thrombus. Noted on 3/18/23 to be febrile up to 101.1F rectally along with lactic acidosis, cultures sent, lactate cleared (7.4 --> 2.7). Urine, paracentesis and blood cultures remained with NGTD. Liver transplant evaluation was opened on 3/17/23, however patients insurance is not compatible with [facility] transplant. Evaluation halted. [place] transplant team consulted for possible transfer.      So Far:  #AUD- MDF 94 (poor prognosis);  ; ABO: O.   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   -s/p para with 3.9 L removed, neg for SBP; SAAG > 1.1.   -US venous duplex (3/16) BL LE neg for DVT.  -US abd doppler (3/17) - Reversal of flow in the main and right portal veins, c/w portal hypertension. Portosystemic collateral formation as evidenced by a recannalized umbilical vein, which is in keeping with findings of portal hypertension. No evidence of thrombus within the IVC, main, left, and right hepatic veins. Patent hepatic arteries.  -transplant workup: normal A1AT; normal ceruloplasmin; (-)HAV IgM; (-)HBV sAg; (-)HBV cAb IgM; (-)HBV DNA PCR; (-)HCV Ab, (-)MIKO neg, (+)Ig; (+)IgA: 1070; (-)IgM; (-)AMA, (-)ASMA; (-)LKM Ab; (+)VZV IgG; (+)mumps IgG; (+)measles IgG; (+)rubella IgG; (-)toxoplasma IgG; (-)syphilis screen; (-)HIV Ab; (-)EBV PCR; (-)CMV PCR         -LT candidacy and evaluation:            [x] Blood type: O            [ ] Psychosocial            [ ] Infectious            [ ] Cardiology:        TTE (3/16) - showed bileaflet mitral valve prolapse with moderate MR, EF 60-65%.                    Cardiac cath:                    Stress test:             [ ] Colonoscopy:             [ ] Mammography:             [ ] Pap smear:             [x] Dental: not needed per pt's insurance            [x] PT/Frailty: Frail- score 4.9 (3/20)    Treated for presumed pneumonia though likely pulmonary edema per ID. Patient is a 58 year old female w/ Hx  of EtOH abuse,  presented to 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   Underwent diagnostic/therapeutic paracentesis with 3860cc removed, cell count 68 on 3/17/23. US doppler completed with portal hypertension without evidence of thrombus. Noted on 3/18/23 to be febrile up to 101.1F rectally along with lactic acidosis, cultures sent, lactate cleared (7.4 --> 2.7). Urine, paracentesis and blood cultures remained with NGTD. Liver transplant evaluation was opened on 3/17/23, however patients insurance is not compatible with [facility] transplant. Evaluation halted. [place] transplant team consulted for possible transfer.    So Far:  #AUD- MDF 94 (poor prognosis);  ; ABO: O.   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   -s/p para with 3.9 L removed, neg for SBP; SAAG > 1.1.   -US venous duplex (3/16) BL LE neg for DVT.  -US abd doppler (3/17) - Reversal of flow in the main and right portal veins, c/w portal hypertension. Portosystemic collateral formation as evidenced by a recannalized umbilical vein, which is in keeping with findings of portal hypertension. No evidence of thrombus within the IVC, main, left, and right hepatic veins. Patent hepatic arteries.  -transplant workup: normal A1AT; normal ceruloplasmin; (-)HAV IgM; (-)HBV sAg; (-)HBV cAb IgM; (-)HBV DNA PCR; (-)HCV Ab, (-)MIKO neg, (+)Ig; (+)IgA: 1070; (-)IgM; (-)AMA, (-)ASMA; (-)LKM Ab; (+)VZV IgG; (+)mumps IgG; (+)measles IgG; (+)rubella IgG; (-)toxoplasma IgG; (-)syphilis screen; (-)HIV Ab; (-)EBV PCR; (-)CMV PCR     -LT candidacy and evaluation:            [x] Blood type: O            [ ] Psychosocial            [ ] Infectious            [ ] Cardiology:        TTE (3/16) - showed bileaflet mitral valve prolapse with moderate MR, EF 60-65%.                    Cardiac cath:                    Stress test:             [ ] Colonoscopy:             [ ] Mammography:             [ ] Pap smear:             [x] Dental: not needed per pt's insurance            [x] PT/Frailty: Frail- score 4.9 (3/20)    Treated for presumed pneumonia though likely pulmonary edema per ID.   On 3/23 pending transfer to [facility] per insurance auth.   On 3/24 pt. accepted to [facility] and discharged (d/w attending)

## 2023-03-22 NOTE — DISCHARGE NOTE PROVIDER - NSDCPNSUBOBJ_GEN_ALL_CORE
#acute alcoholic hepatitis  #Decompensated hepatic cirrhosis  #electrolyte distrubances  #Pneumonia, gram negative  #Anemia of chronic disease   #thrombocytopenia  # Hypokalemia, Hypomagnesemia, hypophosphatemia present on admission

## 2023-03-22 NOTE — DISCHARGE NOTE PROVIDER - NSDCMRMEDTOKEN_GEN_ALL_CORE_FT
spironolactone 25 mg oral tablet: 1 tab(s) orally once a day   folic acid 1 mg oral tablet: 1 tab(s) orally once a day  Multiple Vitamins oral tablet: 1 tab(s) orally once a day  pantoprazole 40 mg oral delayed release tablet: 1 tab(s) orally once a day (before a meal)  piperacillin-tazobactam: 3.375 gram(s) intravenous every 8 hours until 3/25 for emperic PNA  thiamine 100 mg oral tablet: 1 tab(s) orally once a day

## 2023-03-22 NOTE — DISCHARGE NOTE PROVIDER - NSDCCAREPROVSEEN_GEN_ALL_CORE_FT
Abdulkadir, Bradley Covarrubias, Shannan Chavez, Marta Hilton, Shannan Melo, Jeffrey Randall, Chely Preston, Prabha Mancuso

## 2023-03-23 LAB
ALBUMIN SERPL ELPH-MCNC: 3.5 G/DL — SIGNIFICANT CHANGE UP (ref 3.3–5)
ALP SERPL-CCNC: 98 U/L — SIGNIFICANT CHANGE UP (ref 40–120)
ALT FLD-CCNC: 31 U/L — SIGNIFICANT CHANGE UP (ref 10–45)
ANION GAP SERPL CALC-SCNC: 12 MMOL/L — SIGNIFICANT CHANGE UP (ref 5–17)
APTT BLD: 64 SEC — HIGH (ref 27.5–35.5)
AST SERPL-CCNC: 91 U/L — HIGH (ref 10–40)
BILIRUB SERPL-MCNC: 24.9 MG/DL — HIGH (ref 0.2–1.2)
BUN SERPL-MCNC: 5 MG/DL — LOW (ref 7–23)
CALCIUM SERPL-MCNC: 8 MG/DL — LOW (ref 8.4–10.5)
CHLORIDE SERPL-SCNC: 103 MMOL/L — SIGNIFICANT CHANGE UP (ref 96–108)
CO2 SERPL-SCNC: 21 MMOL/L — LOW (ref 22–31)
CREAT SERPL-MCNC: <0.3 MG/DL — LOW (ref 0.5–1.3)
EGFR: 124 ML/MIN/1.73M2 — SIGNIFICANT CHANGE UP
GLUCOSE SERPL-MCNC: 91 MG/DL — SIGNIFICANT CHANGE UP (ref 70–99)
HCT VFR BLD CALC: 30.2 % — LOW (ref 34.5–45)
HGB BLD-MCNC: 10.6 G/DL — LOW (ref 11.5–15.5)
INR BLD: 2.81 RATIO — HIGH (ref 0.88–1.16)
MAGNESIUM SERPL-MCNC: 1.8 MG/DL — SIGNIFICANT CHANGE UP (ref 1.6–2.6)
MCHC RBC-ENTMCNC: 35.1 GM/DL — SIGNIFICANT CHANGE UP (ref 32–36)
MCHC RBC-ENTMCNC: 37.5 PG — HIGH (ref 27–34)
MCV RBC AUTO: 106.7 FL — HIGH (ref 80–100)
MRSA PCR RESULT.: SIGNIFICANT CHANGE UP
NRBC # BLD: 0 /100 WBCS — SIGNIFICANT CHANGE UP (ref 0–0)
PHOSPHATE SERPL-MCNC: 1.7 MG/DL — LOW (ref 2.5–4.5)
PLATELET # BLD AUTO: 67 K/UL — LOW (ref 150–400)
POTASSIUM SERPL-MCNC: 3.6 MMOL/L — SIGNIFICANT CHANGE UP (ref 3.5–5.3)
POTASSIUM SERPL-SCNC: 3.6 MMOL/L — SIGNIFICANT CHANGE UP (ref 3.5–5.3)
PROT SERPL-MCNC: 5.4 G/DL — LOW (ref 6–8.3)
PROTHROM AB SERPL-ACNC: 32.9 SEC — HIGH (ref 10.5–13.4)
RBC # BLD: 2.83 M/UL — LOW (ref 3.8–5.2)
RBC # FLD: 15.7 % — HIGH (ref 10.3–14.5)
S AUREUS DNA NOSE QL NAA+PROBE: SIGNIFICANT CHANGE UP
SARS-COV-2 RNA SPEC QL NAA+PROBE: SIGNIFICANT CHANGE UP
SODIUM SERPL-SCNC: 136 MMOL/L — SIGNIFICANT CHANGE UP (ref 135–145)
WBC # BLD: 18.21 K/UL — HIGH (ref 3.8–10.5)
WBC # FLD AUTO: 18.21 K/UL — HIGH (ref 3.8–10.5)

## 2023-03-23 PROCEDURE — 99232 SBSQ HOSP IP/OBS MODERATE 35: CPT

## 2023-03-23 PROCEDURE — 99233 SBSQ HOSP IP/OBS HIGH 50: CPT | Mod: GC

## 2023-03-23 PROCEDURE — 99233 SBSQ HOSP IP/OBS HIGH 50: CPT

## 2023-03-23 PROCEDURE — 71045 X-RAY EXAM CHEST 1 VIEW: CPT | Mod: 26

## 2023-03-23 RX ORDER — FUROSEMIDE 40 MG
80 TABLET ORAL ONCE
Refills: 0 | Status: COMPLETED | OUTPATIENT
Start: 2023-03-23 | End: 2023-03-23

## 2023-03-23 RX ORDER — FOLIC ACID 0.8 MG
1 TABLET ORAL
Qty: 0 | Refills: 0 | DISCHARGE
Start: 2023-03-23

## 2023-03-23 RX ORDER — PIPERACILLIN AND TAZOBACTAM 4; .5 G/20ML; G/20ML
3.38 INJECTION, POWDER, LYOPHILIZED, FOR SOLUTION INTRAVENOUS
Qty: 0 | Refills: 0 | DISCHARGE
Start: 2023-03-23

## 2023-03-23 RX ORDER — POTASSIUM PHOSPHATE, MONOBASIC POTASSIUM PHOSPHATE, DIBASIC 236; 224 MG/ML; MG/ML
30 INJECTION, SOLUTION INTRAVENOUS ONCE
Refills: 0 | Status: COMPLETED | OUTPATIENT
Start: 2023-03-23 | End: 2023-03-23

## 2023-03-23 RX ORDER — THIAMINE MONONITRATE (VIT B1) 100 MG
1 TABLET ORAL
Qty: 0 | Refills: 0 | DISCHARGE
Start: 2023-03-23

## 2023-03-23 RX ORDER — PANTOPRAZOLE SODIUM 20 MG/1
1 TABLET, DELAYED RELEASE ORAL
Qty: 0 | Refills: 0 | DISCHARGE
Start: 2023-03-23

## 2023-03-23 RX ORDER — MAGNESIUM SULFATE 500 MG/ML
2 VIAL (ML) INJECTION ONCE
Refills: 0 | Status: COMPLETED | OUTPATIENT
Start: 2023-03-23 | End: 2023-03-23

## 2023-03-23 RX ORDER — PANTOPRAZOLE SODIUM 20 MG/1
40 TABLET, DELAYED RELEASE ORAL
Refills: 0 | Status: DISCONTINUED | OUTPATIENT
Start: 2023-03-23 | End: 2023-03-24

## 2023-03-23 RX ORDER — SPIRONOLACTONE 25 MG/1
1 TABLET, FILM COATED ORAL
Qty: 0 | Refills: 0 | DISCHARGE

## 2023-03-23 RX ADMIN — Medication 1 MILLIGRAM(S): at 11:11

## 2023-03-23 RX ADMIN — Medication 2 TABLET(S): at 11:11

## 2023-03-23 RX ADMIN — Medication 2 TABLET(S): at 15:26

## 2023-03-23 RX ADMIN — PIPERACILLIN AND TAZOBACTAM 25 GRAM(S): 4; .5 INJECTION, POWDER, LYOPHILIZED, FOR SOLUTION INTRAVENOUS at 06:17

## 2023-03-23 RX ADMIN — Medication 2 TABLET(S): at 04:07

## 2023-03-23 RX ADMIN — Medication 80 MILLIGRAM(S): at 15:10

## 2023-03-23 RX ADMIN — Medication 25 GRAM(S): at 15:09

## 2023-03-23 RX ADMIN — PIPERACILLIN AND TAZOBACTAM 25 GRAM(S): 4; .5 INJECTION, POWDER, LYOPHILIZED, FOR SOLUTION INTRAVENOUS at 15:09

## 2023-03-23 RX ADMIN — PIPERACILLIN AND TAZOBACTAM 25 GRAM(S): 4; .5 INJECTION, POWDER, LYOPHILIZED, FOR SOLUTION INTRAVENOUS at 22:40

## 2023-03-23 RX ADMIN — Medication 102 MILLIGRAM(S): at 12:12

## 2023-03-23 RX ADMIN — Medication 1 TABLET(S): at 11:16

## 2023-03-23 RX ADMIN — Medication 100 MILLIGRAM(S): at 11:12

## 2023-03-23 RX ADMIN — POTASSIUM PHOSPHATE, MONOBASIC POTASSIUM PHOSPHATE, DIBASIC 83.33 MILLIMOLE(S): 236; 224 INJECTION, SOLUTION INTRAVENOUS at 17:06

## 2023-03-23 NOTE — PROGRESS NOTE ADULT - ATTENDING COMMENTS
ALD cirrhosis, awaiting the transfer to [facility] for liver transplant evaluation. Continue prednisone 40 mg a day for Alcoholic hepatitis.
57 yo F with ALD cirrhosis, HE, ascites, not a candidate for liver transplant at [facility]. Discussed with the pt and family re: options
59 yo F with ALD cirrhosis, HE, ascites, not a candidate for liver transplant at [facility]. Discussed with the pt and family (daughter and ) re: options. The pt and family want to transfer the pt to [facility] for liver transplant evaluation. Contacted [facility] transfer office and spoke to the on call transplant hepatology fellow. The pt is accepted to be transferred to [facility] when a bed becomes available.

## 2023-03-23 NOTE — PROGRESS NOTE ADULT - SUBJECTIVE AND OBJECTIVE BOX
Chief Complaint:  Patient is a 58y old  Female who presents with a chief complaint of liver failure (23 Mar 2023 09:49)      Interval Events:  Pending transfer to [facility]    Hospital Medications:  folic acid 1 milliGRAM(s) Oral daily  melatonin 3 milliGRAM(s) Oral at bedtime PRN  multivitamin 1 Tablet(s) Oral daily  ondansetron Injectable 4 milliGRAM(s) IV Push every 8 hours PRN  pantoprazole    Tablet 40 milliGRAM(s) Oral before breakfast  phytonadione  IVPB 10 milliGRAM(s) IV Intermittent daily  piperacillin/tazobactam IVPB.. 3.375 Gram(s) IV Intermittent every 8 hours  potassium phosphate / sodium phosphate Tablet (K-PHOS No. 2) 2 Tablet(s) Oral every 6 hours  thiamine 100 milliGRAM(s) Oral daily      PMHX/PSHX:  Alcohol abuse    No significant past surgical history            ROS: 14 point ROS negative unless otherwise stated in subjective      PHYSICAL EXAM:     GENERAL:  Appears stated age, well-groomed, +chronically ill appearing  HEENT:  NC/AT, +scleral icterus  CHEST:  Full & symmetric excursion; +bibasilar crackles  HEART:  Regular rhythm, S1, S2, no murmur/rub/S3/S4  ABDOMEN:  Soft, non-tender, +mildly-distended  EXTREMITIES: 2+ BL edema  SKIN:  +jaundice  NEURO:  Alert, orientedx3, no asterixis    Vital Signs:  Vital Signs Last 24 Hrs  T(C): 36.7 (23 Mar 2023 09:30), Max: 37.7 (23 Mar 2023 00:24)  T(F): 98 (23 Mar 2023 09:30), Max: 99.8 (23 Mar 2023 00:24)  HR: 94 (23 Mar 2023 09:30) (88 - 100)  BP: 102/69 (23 Mar 2023 09:30) (102/69 - 117/68)  BP(mean): --  RR: 18 (23 Mar 2023 09:30) (18 - 18)  SpO2: 96% (23 Mar 2023 09:30) (93% - 96%)    Parameters below as of 23 Mar 2023 09:30  Patient On (Oxygen Delivery Method): room air      Daily     Daily     LABS:                        10.6   18.21 )-----------( 67       ( 23 Mar 2023 07:11 )             30.2     03-23    136  |  103  |  5<L>  ----------------------------<  91  3.6   |  21<L>  |  <0.30<L>    Ca    8.0<L>      23 Mar 2023 07:10  Phos  1.7     03-23  Mg     1.8     03-23    TPro  5.4<L>  /  Alb  3.5  /  TBili  24.9<H>  /  DBili  x   /  AST  91<H>  /  ALT  31  /  AlkPhos  98  03-23    LIVER FUNCTIONS - ( 23 Mar 2023 07:10 )  Alb: 3.5 g/dL / Pro: 5.4 g/dL / ALK PHOS: 98 U/L / ALT: 31 U/L / AST: 91 U/L / GGT: x           PT/INR - ( 23 Mar 2023 07:11 )   PT: 32.9 sec;   INR: 2.81 ratio         PTT - ( 23 Mar 2023 07:11 )  PTT:64.0 sec        Imaging: No new abdominal imaging

## 2023-03-23 NOTE — PROGRESS NOTE ADULT - SUBJECTIVE AND OBJECTIVE BOX
Follow Up:      Interval History:    REVIEW OF SYSTEMS  [  ] ROS unobtainable because:    [  ] All other systems negative except as noted below    Constitutional:  [ ] fever [ ] chills  [ ] weight loss  [ ] weakness  Skin:  [ ] rash [ ] phlebitis	  Eyes: [ ] icterus [ ] pain  [ ] discharge	  ENMT: [ ] sore throat  [ ] thrush [ ] ulcers [ ] exudates  Respiratory: [ ] dyspnea [ ] hemoptysis [ ] cough [ ] sputum	  Cardiovascular:  [ ] chest pain [ ] palpitations [ ] edema	  Gastrointestinal:  [ ] nausea [ ] vomiting [ ] diarrhea [ ] constipation [ ] pain	  Genitourinary:  [ ] dysuria [ ] frequency [ ] hematuria [ ] discharge [ ] flank pain  [ ] incontinence  Musculoskeletal:  [ ] myalgias [ ] arthralgias [ ] arthritis  [ ] back pain  Neurological:  [ ] headache [ ] seizures  [ ] confusion/altered mental status    Allergies  No Known Allergies        ANTIMICROBIALS:  piperacillin/tazobactam IVPB.. 3.375 every 8 hours      OTHER MEDS:  MEDICATIONS  (STANDING):  furosemide   Injectable 80 once  melatonin 3 at bedtime PRN  ondansetron Injectable 4 every 8 hours PRN  pantoprazole    Tablet 40 before breakfast      Vital Signs Last 24 Hrs  T(C): 36.7 (23 Mar 2023 09:30), Max: 37.7 (23 Mar 2023 00:24)  T(F): 98 (23 Mar 2023 09:30), Max: 99.8 (23 Mar 2023 00:24)  HR: 94 (23 Mar 2023 09:30) (88 - 100)  BP: 102/69 (23 Mar 2023 09:30) (102/69 - 117/68)  BP(mean): --  RR: 18 (23 Mar 2023 09:30) (18 - 18)  SpO2: 96% (23 Mar 2023 09:30) (93% - 96%)    Parameters below as of 23 Mar 2023 09:30  Patient On (Oxygen Delivery Method): room air        PHYSICAL EXAMINATION:  General: Alert and Awake, NAD  HEENT: PERRL, EOMI  Neck: Supple  Cardiac: RRR, No M/R/G  Resp: CTAB, No Wh/Rh/Ra  Abdomen: NBS, NT/ND, No HSM, No rigidity or guarding  MSK: No LE edema. No Calf tenderness  : No martins  Skin: No rashes or lesions. Skin is warm and dry to the touch.   Neuro: Alert and Awake. CN 2-12 Grossly intact. Moves all four extremities spontaneously.  Psych: Calm, Pleasant, Cooperative                          10.6   18.21 )-----------( 67       ( 23 Mar 2023 07:11 )             30.2       03-23    136  |  103  |  5<L>  ----------------------------<  91  3.6   |  21<L>  |  <0.30<L>    Ca    8.0<L>      23 Mar 2023 07:10  Phos  1.7     03-23  Mg     1.8     03-23    TPro  5.4<L>  /  Alb  3.5  /  TBili  24.9<H>  /  DBili  x   /  AST  91<H>  /  ALT  31  /  AlkPhos  98  03-23          MICROBIOLOGY:  v  .Blood Blood-Peripheral  03-18-23   No growth to date.  --  --      .Blood Blood  03-17-23   No Growth Final  --  --      Abdominal Fl Abdominal Fluid  03-17-23   No growth at 5 days  --    polymorphonuclear leukocytes seen  No organisms seen  by cytocentrifuge        Toxoplasma IgG Screen: <3.0 IU/mL (03-18-23 @ 07:34)  CMV IgG Antibody: <0.20 U/mL (03-18-23 @ 07:33)    Rapid RVP Result: NotDetec (03-21 @ 00:58)  CMVPCR Log: NotDetec Edr39CO/mL (03-18 @ 07:35)        RADIOLOGY:    <The imaging below has been reviewed and visualized by me independently. Findings as detailed in report below> Follow Up:  Pre-OLT    Interval History: afebrile. denies cough. no acute complaints.     REVIEW OF SYSTEMS  [  ] ROS unobtainable because:    [ x ] All other systems negative except as noted below    Constitutional:  [ ] fever [ ] chills  [ ] weight loss  [ ] weakness  Skin:  [ ] rash [ ] phlebitis	  Eyes: [ ] icterus [ ] pain  [ ] discharge	  ENMT: [ ] sore throat  [ ] thrush [ ] ulcers [ ] exudates  Respiratory: [ ] dyspnea [ ] hemoptysis [ ] cough [ ] sputum	  Cardiovascular:  [ ] chest pain [ ] palpitations [ ] edema	  Gastrointestinal:  [ ] nausea [ ] vomiting [ ] diarrhea [ ] constipation [ ] pain	  Genitourinary:  [ ] dysuria [ ] frequency [ ] hematuria [ ] discharge [ ] flank pain  [ ] incontinence  Musculoskeletal:  [ ] myalgias [ ] arthralgias [ ] arthritis  [ ] back pain  Neurological:  [ ] headache [ ] seizures  [ ] confusion/altered mental status    Allergies  No Known Allergies        ANTIMICROBIALS:  piperacillin/tazobactam IVPB.. 3.375 every 8 hours      OTHER MEDS:  MEDICATIONS  (STANDING):  furosemide   Injectable 80 once  melatonin 3 at bedtime PRN  ondansetron Injectable 4 every 8 hours PRN  pantoprazole    Tablet 40 before breakfast      Vital Signs Last 24 Hrs  T(C): 36.7 (23 Mar 2023 09:30), Max: 37.7 (23 Mar 2023 00:24)  T(F): 98 (23 Mar 2023 09:30), Max: 99.8 (23 Mar 2023 00:24)  HR: 94 (23 Mar 2023 09:30) (88 - 100)  BP: 102/69 (23 Mar 2023 09:30) (102/69 - 117/68)  BP(mean): --  RR: 18 (23 Mar 2023 09:30) (18 - 18)  SpO2: 96% (23 Mar 2023 09:30) (93% - 96%)    Parameters below as of 23 Mar 2023 09:30  Patient On (Oxygen Delivery Method): room air      PHYSICAL EXAMINATION:  General: Alert and Awake, jaundice  HEENT: PERRL, EOMI, Scleral Icterus  Cardiac: RRR, No M/R/G  Resp: CTAB, No Wh/Rh/Ra  Abdomen: NBS, Moderate distension, Diffuse mild tenderness to palpation, No HSM, No rigidity or guarding  MSK: No LE edema. No stigmata of IE. No evidence of phlebitis. No evidence of synovitis.  Skin: No rashes or lesions. Skin is warm and dry to the touch.   Neuro: Alert and Awake. CN 2-12 Grossly intact. Moves all four extremities spontaneously.  Psych: Calm, Pleasant, Cooperative                          10.6   18.21 )-----------( 67       ( 23 Mar 2023 07:11 )             30.2       03-23    136  |  103  |  5<L>  ----------------------------<  91  3.6   |  21<L>  |  <0.30<L>    Ca    8.0<L>      23 Mar 2023 07:10  Phos  1.7     03-23  Mg     1.8     03-23    TPro  5.4<L>  /  Alb  3.5  /  TBili  24.9<H>  /  DBili  x   /  AST  91<H>  /  ALT  31  /  AlkPhos  98  03-23          MICROBIOLOGY:  v  .Blood Blood-Peripheral  03-18-23   No growth to date.  --  --      .Blood Blood  03-17-23   No Growth Final  --  --      Abdominal Fl Abdominal Fluid  03-17-23   No growth at 5 days  --    polymorphonuclear leukocytes seen  No organisms seen  by cytocentrifuge        Toxoplasma IgG Screen: <3.0 IU/mL (03-18-23 @ 07:34)  CMV IgG Antibody: <0.20 U/mL (03-18-23 @ 07:33)    Rapid RVP Result: NotDetec (03-21 @ 00:58)  CMVPCR Log: NotDetec Ehx50ZM/mL (03-18 @ 07:35)        RADIOLOGY:    <The imaging below has been reviewed and visualized by me independently. Findings as detailed in report below>    < from: US Breast Complete, Bilateral (03.21.23 @ 13:46) >  IMPRESSION:  No sonographic evidence of malignancy.    Routine annual mammography is recommended when clinically feasible.    BI-RADS 1-Negative    < end of copied text >

## 2023-03-23 NOTE — PROGRESS NOTE ADULT - PROBLEM SELECTOR PLAN 3
Meld NA 31  ETOH suspected but other serologies pending, budd chiari negative on US  -s/p para with 3.9 L removed, neg for SBP; SAAG > 1.1.   -US venous duplex (3/16) BL LE neg for DVT.  -workup: (-)HAV IgM; (-)HBV sAg; (-)HBV cAb IgM; (-)HCV Ab, MIKO neg, Alpha 1 antitrypsin neg,   - Blood type: O negative.    - TTE obtained on 3/16 - showed bileaflet mitral valve prolapse with moderate MR, EF 60-65%.  - Pending transfer to [facility] pending authorization from insurance insurance won't cover here.   Still needs: Cards eval, PT/BH/SW, EGD/Colon

## 2023-03-23 NOTE — PROGRESS NOTE ADULT - SUBJECTIVE AND OBJECTIVE BOX
DATE OF SERVICE: 03-23-23 @ 16:57    Patient is a 58y old  Female who presents with a chief complaint of liver failure (23 Mar 2023 15:00)      INTERVAL HISTORY: Feels ok.                       REVIEW OF SYSTEMS:  CONSTITUTIONAL: No weakness  EYES/ENT: No visual changes;  No throat pain   NECK: No pain or stiffness  RESPIRATORY: No cough, wheezing; No shortness of breath  CARDIOVASCULAR: No chest pain or palpitations  GASTROINTESTINAL: No abdominal  pain. No nausea, vomiting, or hematemesis  GENITOURINARY: No dysuria, frequency or hematuria  NEUROLOGICAL: No stroke like symptoms  SKIN: No rashes    	  MEDICATIONS:        PHYSICAL EXAM:  T(C): 36.7 (03-23-23 @ 09:30), Max: 37.7 (03-23-23 @ 00:24)  HR: 94 (03-23-23 @ 09:30) (88 - 100)  BP: 102/69 (03-23-23 @ 09:30) (102/69 - 117/68)  RR: 18 (03-23-23 @ 09:30) (18 - 18)  SpO2: 96% (03-23-23 @ 09:30) (93% - 96%)  Wt(kg): --  I&O's Summary    22 Mar 2023 07:01  -  23 Mar 2023 07:00  --------------------------------------------------------  IN: 1210 mL / OUT: 0 mL / NET: 1210 mL    23 Mar 2023 07:01  -  23 Mar 2023 16:57  --------------------------------------------------------  IN: 480 mL / OUT: 0 mL / NET: 480 mL          Appearance: In no distress, jaundiced	  HEENT:    PERRL, EOMI	  Cardiovascular:  S1 S2, + JVD  Respiratory: Lungs clear to auscultation	  Gastrointestinal:  Soft, Non-tender, + BS	  Vascularature:  +1 edema of LE  Psychiatric: Appropriate affect   Neuro: no acute focal deficits                               10.6   18.21 )-----------( 67       ( 23 Mar 2023 07:11 )             30.2     03-23    136  |  103  |  5<L>  ----------------------------<  91  3.6   |  21<L>  |  <0.30<L>    Ca    8.0<L>      23 Mar 2023 07:10  Phos  1.7     03-23  Mg     1.8     03-23    TPro  5.4<L>  /  Alb  3.5  /  TBili  24.9<H>  /  DBili  x   /  AST  91<H>  /  ALT  31  /  AlkPhos  98  03-23        Labs personally reviewed      ASSESSMENT/PLAN: 	    58F EtOH abuse,  transferred from [facility]  for acute liver failure.     Problem/Plan - 1:  ·  Problem: Lower extremity edema.  ·  Plan:   Echocardiogram. 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  Family considering [place] [facility] [place] TP workup.   3/23 s/p Lasix 80 IVP with goof clinical response  Likely 2/2 portal hypertension, less likely 2/2 HF -- consider initiation of non-selective BB  C/w Diuresis PRN    Problem/Plan - 2:  ·  Problem: Acute hypoxemic respiratory failure.  ·  Plan: 2/2 to pleural effusions   Diuresis PRN  CXR pending  wean 02 to maintain >92%, currently on 2.5L saturating well.    Problem/Plan - 3:  ·  Problem: Need for prophylactic measure.   ·  Plan: lovenox 40.          Shannan Hilton, AG-NP   Jeffrey Melo,  [facility]  Cardiovascular Medicine  [address], [address]  Available through call or text on [employer] TEAMs  Office: [phone]

## 2023-03-23 NOTE — PROGRESS NOTE ADULT - ASSESSMENT
58 year old English and Polish speaking F w/ AUD presented to 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 She was treated with ABX for possible UTI and SBP prophylaxis and  transferred to [facility] for further evaluation on 3/16. History obtained from pt and her daughter. Over the last 6 months has been drinking more alcohol at home (mostly vodka). Had been drinking ETOH before, after and while at work. Works as a ; last worked 2022. She lives with her  who also drinks (but reportedly drinks less that the pt). Pt has 2 children- one daughter who lives in [place] and a son in [place]. Pt went to OP ETOH rehab (group and individual therapy) starting in 2023 but has still been drinking despite therapy. Last drink was 3/9/23.      #AUD- MDF 94 (poor prognosis); consented for LT eval (3/17), however pt's insurance will not cover transplant eval at [facility], pending transfer to [facility]; UNOS/OPTN MELDNa 30 (3/23); ABO: O.   -s/p para with 3.9 L removed, neg for SBP; SAAG > 1.1.   -US venous duplex (3/16) BL LE neg for DVT.  -US abd doppler (3/17) - Reversal of flow in the main and right portal veins, c/w portal hypertension. Portosystemic collateral formation as evidenced by a recannalized umbilical vein, which is in keeping with findings of portal hypertension. No evidence of thrombus within the IVC, main, left, and right hepatic veins. Patent hepatic arteries.  -transplant workup: normal A1AT; normal ceruloplasmin; (-)HAV IgM; (-)HBV sAg; (-)HBV cAb IgM; (-)HBV DNA PCR; (-)HCV Ab, (-)MIKO neg, (+)Ig; (+)IgA: 1070; (-)IgM; (-)AMA, (-)ASMA; (-)LKM Ab; (+)VZV IgG; (+)mumps IgG; (+)measles IgG; (+)rubella IgG; (-)toxoplasma IgG; (-)syphilis screen; (-)HIV Ab; (-)EBV PCR; (-)CMV PCR  - s/p Albumin 100 cc 25% q8h (3/19-3/22)         -LT candidacy and evaluation:            [x] Blood type: O            [ ] Psychosocial            [ ] Infectious            [ ] Cardiology:        TTE (3/16) - showed bileaflet mitral valve prolapse with moderate MR, EF 60-65%.                    Cardiac cath:                    Stress test:             [ ] Colonoscopy:             [x] Mammography: s/p BL breast US (3/21)- BIRADS-1            [ ] Pap smear:             [x] Dental: not needed per pt's insurance            [x] PT/Frailty: Frail- score 4.9 (3/20)    #c/f possible sepsis- resolved  Has persistent fevers with leukocytosis, no infectious source identified.   -Infectious work up: CXR () worsening L basilar infiltrate, UA neg. bl cx x2 (3/17, 3/18) NGTD. Ascitic fluid cx (3/17) NGTD.    Recs:  - c/w Zosyn (3/18 ->)  - c/w MVI/thiamine/folic acid  - Vit K 10 mg x 3 days (3/22-3/24)  - c/w PPI 40 mg daily  - IV lasix 80 x1 today (ordered)  - replete K, Mg, Phos (ordered)  - f/u PETH   - please consult nutrition/PT  - trend clinical symptoms, exam findings, vital signs, CBC, CMP, INR, Mg, phosphorus  - repeat CXR today (ordered)--> if negative, will consider starting pred 40 daily given high DF    Bundle:  DVT ppx: SCDs  Diet: low sodium  NOK: Aiden Reyes () [phone]; Amisha Parker (daughter)- [phone]  IV access: PIV  Dispo: pending transfer to [facility] for LT eval  Code status: full code      **THIS NOTE IS NOT FINALIZED UNTIL SIGNED BY THE ATTENDING**    Chely Randall MD  GI Fellow, PGY-5  Available via Microsoft Teams    NON-URGENT CONSULTS:  Please email [email or web address] [email or web address]  [email or web address]  AT NIGHT AND ON WEEKENDS:  Contact on-call GI fellow via answering service ([phone]) from 5pm-8am and on weekends/holidays  MONDAY-FRIDAY 8AM-5PM:  Pager# [phone] ([facility]IE) or [phone] ([facility])  GI Phone# [phone] ([facility])

## 2023-03-23 NOTE — PROGRESS NOTE ADULT - SUBJECTIVE AND OBJECTIVE BOX
PROGRESS NOTE:   Prabha Dias DO  Hospitalist  Pager [phone]  After 5pm/weekends or if no answer ext: [phone]      Patient is a 58y old  Female who presents with a chief complaint of liver failure (22 Mar 2023 14:19)      SUBJECTIVE / OVERNIGHT EVENTS:  Walking with PT     ADDITIONAL REVIEW OF SYSTEMS: No fever or chills no nv/d    MEDICATIONS  (STANDING):  folic acid 1 milliGRAM(s) Oral daily  multivitamin 1 Tablet(s) Oral daily  pantoprazole    Tablet 40 milliGRAM(s) Oral before breakfast  phytonadione  IVPB 10 milliGRAM(s) IV Intermittent daily  piperacillin/tazobactam IVPB.. 3.375 Gram(s) IV Intermittent every 8 hours  potassium phosphate / sodium phosphate Tablet (K-PHOS No. 2) 2 Tablet(s) Oral every 6 hours  thiamine 100 milliGRAM(s) Oral daily    MEDICATIONS  (PRN):  melatonin 3 milliGRAM(s) Oral at bedtime PRN Insomnia  ondansetron Injectable 4 milliGRAM(s) IV Push every 8 hours PRN Nausea and/or Vomiting      CAPILLARY BLOOD GLUCOSE        I&O's Summary    22 Mar 2023 07:01  -  23 Mar 2023 07:00  --------------------------------------------------------  IN: 1210 mL / OUT: 0 mL / NET: 1210 mL        PHYSICAL EXAM:  Vital Signs Last 24 Hrs  T(C): 36.8 (23 Mar 2023 04:31), Max: 37.7 (23 Mar 2023 00:24)  T(F): 98.2 (23 Mar 2023 04:31), Max: 99.8 (23 Mar 2023 00:24)  HR: 88 (23 Mar 2023 04:31) (88 - 100)  BP: 110/72 (23 Mar 2023 04:31) (103/62 - 117/68)  BP(mean): --  RR: 18 (23 Mar 2023 04:31) (18 - 18)  SpO2: 94% (23 Mar 2023 04:31) (93% - 96%)    Parameters below as of 23 Mar 2023 04:31  Patient On (Oxygen Delivery Method): room air        CONSTITUTIONAL: NAD, jaundice, non toxic   RESPIRATORY: Normal respiratory effort; lungs are clear to auscultation bilaterally  CARDIOVASCULAR: Regular rate and rhythm, normal S1 and S2, no murmur/rub/gallop; No lower extremity edema; Peripheral pulses are 2+ bilaterally  ABDOMEN: Nontender to palpation, normoactive bowel sounds, no rebound/guarding; No hepatosplenomegaly  MUSCLOSKELETAL: no clubbing or cyanosis of digits; no joint swelling or tenderness to palpation  PSYCH: A+O to person, place, and time; affect appropriate    LABS:                        10.6   18.21 )-----------( 67       ( 23 Mar 2023 07:11 )             30.2     03-23    136  |  103  |  5<L>  ----------------------------<  91  3.6   |  21<L>  |  <0.30<L>    Ca    8.0<L>      23 Mar 2023 07:10  Phos  1.7     03-23  Mg     1.8     03-23    TPro  5.4<L>  /  Alb  3.5  /  TBili  24.9<H>  /  DBili  x   /  AST  91<H>  /  ALT  31  /  AlkPhos  98  03-23    PT/INR - ( 23 Mar 2023 07:11 )   PT: 32.9 sec;   INR: 2.81 ratio         PTT - ( 23 Mar 2023 07:11 )  PTT:64.0 sec            RADIOLOGY & ADDITIONAL TESTS:  Results Reviewed:   Imaging Personally Reviewed:  Electrocardiogram Personally Reviewed:    COORDINATION OF CARE:  Care Discussed with Consultants/Other Providers [Y/N]:  Prior or Outpatient Records Reviewed [Y/N]:

## 2023-03-23 NOTE — PROGRESS NOTE ADULT - ASSESSMENT
58 year old female PMH EtOH abuse who presented to [facility] on 3/13/23 for worsening generalized weakness in setting of lower extremity edema and ascites over a two week period. Transferred to [facility] on 3/16/23 for further evaluation.       BCx (3/17, 3/18) NGTD.   UA (3/19) 11 WBC.   COVID19 PCR (3/23) Negative  RVP (3/21) Negative  GI PCR (3/18) Negative.   MRSA/MSSA PCR (3/23) Negative  Paracentesis (3/17) 68 nucleated cells with 4% PMN.     CXR (3/18) Bibasilar atelectasis mild congestion.   Doppler Abdomen (3/17) No evidence of thrombus within the IVC, main, left, and right hepatic veins.    At this point ID workup thus far is unrevealing  Fever this admission could be related to alcoholic hepatitis    #Fever, Leukocytosis  --Stop Zosyn; ID workup has been unrevealing  --Continue to follow CBC with diff  --Continue to follow temperature curve  --Follow up on preliminary blood cultures    #Pre-Transplant Evaluation  --ID Clearance for OLT pending QuantiFeron Gold  --Re-ordered QuantiFeron Gold; initial sample was cancelled by lab    #Encounter to Vaccinate Patient  COVID19: Pfizer 2x (last dose in 2021). Would benefit from Bivalent Booster  Influenza: Will require  Pneumococcal: Would benefit from PCV20  HAV: Will require Havrix  HBV: Will require Heplisav  MMR: Immune  Varicella: Immune  Shingles: Will require Shingrix  Tdap: Will require Tdap    I will continue to follow. Please feel free to contact me with any further questions.    Bradley Panchal M.D.  [facility] Division of Infectious Disease  8AM-5PM Monday - Friday: Available on [employer] Teams  After Hours and Holidays (or if no response on [employer] Teams): Please contact the Infectious Diseases Office at [phone]

## 2023-03-24 ENCOUNTER — TRANSCRIPTION ENCOUNTER (OUTPATIENT)
Age: 58
End: 2023-03-24

## 2023-03-24 VITALS
RESPIRATION RATE: 18 BRPM | TEMPERATURE: 98 F | SYSTOLIC BLOOD PRESSURE: 94 MMHG | HEART RATE: 99 BPM | DIASTOLIC BLOOD PRESSURE: 65 MMHG | OXYGEN SATURATION: 98 %

## 2023-03-24 LAB
ALBUMIN SERPL ELPH-MCNC: 3.5 G/DL — SIGNIFICANT CHANGE UP (ref 3.3–5)
ALP SERPL-CCNC: 104 U/L — SIGNIFICANT CHANGE UP (ref 40–120)
ALT FLD-CCNC: 35 U/L — SIGNIFICANT CHANGE UP (ref 10–45)
ANION GAP SERPL CALC-SCNC: 12 MMOL/L — SIGNIFICANT CHANGE UP (ref 5–17)
APTT BLD: 51.7 SEC — HIGH (ref 27.5–35.5)
AST SERPL-CCNC: 111 U/L — HIGH (ref 10–40)
BILIRUB SERPL-MCNC: 28.1 MG/DL — HIGH (ref 0.2–1.2)
BUN SERPL-MCNC: 6 MG/DL — LOW (ref 7–23)
CALCIUM SERPL-MCNC: 8.1 MG/DL — LOW (ref 8.4–10.5)
CHLORIDE SERPL-SCNC: 103 MMOL/L — SIGNIFICANT CHANGE UP (ref 96–108)
CO2 SERPL-SCNC: 22 MMOL/L — SIGNIFICANT CHANGE UP (ref 22–31)
CREAT SERPL-MCNC: <0.3 MG/DL — LOW (ref 0.5–1.3)
CULTURE RESULTS: SIGNIFICANT CHANGE UP
CULTURE RESULTS: SIGNIFICANT CHANGE UP
EGFR: 124 ML/MIN/1.73M2 — SIGNIFICANT CHANGE UP
GLUCOSE SERPL-MCNC: 96 MG/DL — SIGNIFICANT CHANGE UP (ref 70–99)
HCT VFR BLD CALC: 30.7 % — LOW (ref 34.5–45)
HGB BLD-MCNC: 10.7 G/DL — LOW (ref 11.5–15.5)
INR BLD: 2.58 RATIO — HIGH (ref 0.88–1.16)
MAGNESIUM SERPL-MCNC: 2 MG/DL — SIGNIFICANT CHANGE UP (ref 1.6–2.6)
MCHC RBC-ENTMCNC: 34.9 GM/DL — SIGNIFICANT CHANGE UP (ref 32–36)
MCHC RBC-ENTMCNC: 37 PG — HIGH (ref 27–34)
MCV RBC AUTO: 106.2 FL — HIGH (ref 80–100)
NRBC # BLD: 0 /100 WBCS — SIGNIFICANT CHANGE UP (ref 0–0)
PHOSPHATE SERPL-MCNC: 1.7 MG/DL — LOW (ref 2.5–4.5)
PLATELET # BLD AUTO: SIGNIFICANT CHANGE UP K/UL (ref 150–400)
POTASSIUM SERPL-MCNC: 3.8 MMOL/L — SIGNIFICANT CHANGE UP (ref 3.5–5.3)
POTASSIUM SERPL-SCNC: 3.8 MMOL/L — SIGNIFICANT CHANGE UP (ref 3.5–5.3)
PROT SERPL-MCNC: 5.6 G/DL — LOW (ref 6–8.3)
PROTHROM AB SERPL-ACNC: 30 SEC — HIGH (ref 10.5–13.4)
RBC # BLD: 2.89 M/UL — LOW (ref 3.8–5.2)
RBC # FLD: 15.3 % — HIGH (ref 10.3–14.5)
SODIUM SERPL-SCNC: 137 MMOL/L — SIGNIFICANT CHANGE UP (ref 135–145)
SPECIMEN SOURCE: SIGNIFICANT CHANGE UP
SPECIMEN SOURCE: SIGNIFICANT CHANGE UP
WBC # BLD: 21.37 K/UL — HIGH (ref 3.8–10.5)
WBC # FLD AUTO: 21.37 K/UL — HIGH (ref 3.8–10.5)

## 2023-03-24 PROCEDURE — 87641 MR-STAPH DNA AMP PROBE: CPT

## 2023-03-24 PROCEDURE — 85014 HEMATOCRIT: CPT

## 2023-03-24 PROCEDURE — 84100 ASSAY OF PHOSPHORUS: CPT

## 2023-03-24 PROCEDURE — 49083 ABD PARACENTESIS W/IMAGING: CPT

## 2023-03-24 PROCEDURE — 84295 ASSAY OF SERUM SODIUM: CPT

## 2023-03-24 PROCEDURE — 84157 ASSAY OF PROTEIN OTHER: CPT

## 2023-03-24 PROCEDURE — 80048 BASIC METABOLIC PNL TOTAL CA: CPT

## 2023-03-24 PROCEDURE — 82390 ASSAY OF CERULOPLASMIN: CPT

## 2023-03-24 PROCEDURE — 97162 PT EVAL MOD COMPLEX 30 MIN: CPT

## 2023-03-24 PROCEDURE — 85025 COMPLETE CBC W/AUTO DIFF WBC: CPT

## 2023-03-24 PROCEDURE — 88305 TISSUE EXAM BY PATHOLOGIST: CPT

## 2023-03-24 PROCEDURE — 86038 ANTINUCLEAR ANTIBODIES: CPT

## 2023-03-24 PROCEDURE — 97530 THERAPEUTIC ACTIVITIES: CPT

## 2023-03-24 PROCEDURE — 97116 GAIT TRAINING THERAPY: CPT

## 2023-03-24 PROCEDURE — 84443 ASSAY THYROID STIM HORMONE: CPT

## 2023-03-24 PROCEDURE — 82330 ASSAY OF CALCIUM: CPT

## 2023-03-24 PROCEDURE — 82728 ASSAY OF FERRITIN: CPT

## 2023-03-24 PROCEDURE — 85027 COMPLETE CBC AUTOMATED: CPT

## 2023-03-24 PROCEDURE — 84300 ASSAY OF URINE SODIUM: CPT

## 2023-03-24 PROCEDURE — 99233 SBSQ HOSP IP/OBS HIGH 50: CPT

## 2023-03-24 PROCEDURE — 82947 ASSAY GLUCOSE BLOOD QUANT: CPT

## 2023-03-24 PROCEDURE — 86787 VARICELLA-ZOSTER ANTIBODY: CPT

## 2023-03-24 PROCEDURE — 86381 MITOCHONDRIAL ANTIBODY EACH: CPT

## 2023-03-24 PROCEDURE — 82803 BLOOD GASES ANY COMBINATION: CPT

## 2023-03-24 PROCEDURE — 80061 LIPID PANEL: CPT

## 2023-03-24 PROCEDURE — 86696 HERPES SIMPLEX TYPE 2 TEST: CPT

## 2023-03-24 PROCEDURE — 86665 EPSTEIN-BARR CAPSID VCA: CPT

## 2023-03-24 PROCEDURE — 86709 HEPATITIS A IGM ANTIBODY: CPT

## 2023-03-24 PROCEDURE — 86644 CMV ANTIBODY: CPT

## 2023-03-24 PROCEDURE — 87521 HEPATITIS C PROBE&RVRS TRNSC: CPT

## 2023-03-24 PROCEDURE — 86682 HELMINTH ANTIBODY: CPT

## 2023-03-24 PROCEDURE — 71045 X-RAY EXAM CHEST 1 VIEW: CPT

## 2023-03-24 PROCEDURE — 97110 THERAPEUTIC EXERCISES: CPT

## 2023-03-24 PROCEDURE — 85610 PROTHROMBIN TIME: CPT

## 2023-03-24 PROCEDURE — 86706 HEP B SURFACE ANTIBODY: CPT

## 2023-03-24 PROCEDURE — 76641 ULTRASOUND BREAST COMPLETE: CPT

## 2023-03-24 PROCEDURE — 86850 RBC ANTIBODY SCREEN: CPT

## 2023-03-24 PROCEDURE — 88112 CYTOPATH CELL ENHANCE TECH: CPT

## 2023-03-24 PROCEDURE — 93971 EXTREMITY STUDY: CPT

## 2023-03-24 PROCEDURE — 87205 SMEAR GRAM STAIN: CPT

## 2023-03-24 PROCEDURE — 86255 FLUORESCENT ANTIBODY SCREEN: CPT

## 2023-03-24 PROCEDURE — 80074 ACUTE HEPATITIS PANEL: CPT

## 2023-03-24 PROCEDURE — 87389 HIV-1 AG W/HIV-1&-2 AB AG IA: CPT

## 2023-03-24 PROCEDURE — 87799 DETECT AGENT NOS DNA QUANT: CPT

## 2023-03-24 PROCEDURE — 82784 ASSAY IGA/IGD/IGG/IGM EACH: CPT

## 2023-03-24 PROCEDURE — 86480 TB TEST CELL IMMUN MEASURE: CPT

## 2023-03-24 PROCEDURE — 86780 TREPONEMA PALLIDUM: CPT

## 2023-03-24 PROCEDURE — 85018 HEMOGLOBIN: CPT

## 2023-03-24 PROCEDURE — 82105 ALPHA-FETOPROTEIN SERUM: CPT

## 2023-03-24 PROCEDURE — 93356 MYOCRD STRAIN IMG SPCKL TRCK: CPT

## 2023-03-24 PROCEDURE — 83550 IRON BINDING TEST: CPT

## 2023-03-24 PROCEDURE — 0225U NFCT DS DNA&RNA 21 SARSCOV2: CPT

## 2023-03-24 PROCEDURE — 89051 BODY FLUID CELL COUNT: CPT

## 2023-03-24 PROCEDURE — 86777 TOXOPLASMA ANTIBODY: CPT

## 2023-03-24 PROCEDURE — 86664 EPSTEIN-BARR NUCLEAR ANTIGEN: CPT

## 2023-03-24 PROCEDURE — 86695 HERPES SIMPLEX TYPE 1 TEST: CPT

## 2023-03-24 PROCEDURE — 82042 OTHER SOURCE ALBUMIN QUAN EA: CPT

## 2023-03-24 PROCEDURE — 86769 SARS-COV-2 COVID-19 ANTIBODY: CPT

## 2023-03-24 PROCEDURE — 86901 BLOOD TYPING SEROLOGIC RH(D): CPT

## 2023-03-24 PROCEDURE — 86765 RUBEOLA ANTIBODY: CPT

## 2023-03-24 PROCEDURE — 86704 HEP B CORE ANTIBODY TOTAL: CPT

## 2023-03-24 PROCEDURE — 76705 ECHO EXAM OF ABDOMEN: CPT

## 2023-03-24 PROCEDURE — 71046 X-RAY EXAM CHEST 2 VIEWS: CPT

## 2023-03-24 PROCEDURE — 83540 ASSAY OF IRON: CPT

## 2023-03-24 PROCEDURE — 36415 COLL VENOUS BLD VENIPUNCTURE: CPT

## 2023-03-24 PROCEDURE — 87640 STAPH A DNA AMP PROBE: CPT

## 2023-03-24 PROCEDURE — 87070 CULTURE OTHR SPECIMN AEROBIC: CPT

## 2023-03-24 PROCEDURE — 87075 CULTR BACTERIA EXCEPT BLOOD: CPT

## 2023-03-24 PROCEDURE — 86762 RUBELLA ANTIBODY: CPT

## 2023-03-24 PROCEDURE — 83605 ASSAY OF LACTIC ACID: CPT

## 2023-03-24 PROCEDURE — 82435 ASSAY OF BLOOD CHLORIDE: CPT

## 2023-03-24 PROCEDURE — 86803 HEPATITIS C AB TEST: CPT

## 2023-03-24 PROCEDURE — 82103 ALPHA-1-ANTITRYPSIN TOTAL: CPT

## 2023-03-24 PROCEDURE — 87517 HEPATITIS B DNA QUANT: CPT

## 2023-03-24 PROCEDURE — G0480: CPT

## 2023-03-24 PROCEDURE — 83615 LACTATE (LD) (LDH) ENZYME: CPT

## 2023-03-24 PROCEDURE — 84439 ASSAY OF FREE THYROXINE: CPT

## 2023-03-24 PROCEDURE — 82140 ASSAY OF AMMONIA: CPT

## 2023-03-24 PROCEDURE — 87102 FUNGUS ISOLATION CULTURE: CPT

## 2023-03-24 PROCEDURE — 80076 HEPATIC FUNCTION PANEL: CPT

## 2023-03-24 PROCEDURE — 86376 MICROSOMAL ANTIBODY EACH: CPT

## 2023-03-24 PROCEDURE — 87340 HEPATITIS B SURFACE AG IA: CPT

## 2023-03-24 PROCEDURE — U0003: CPT

## 2023-03-24 PROCEDURE — P9047: CPT

## 2023-03-24 PROCEDURE — 87040 BLOOD CULTURE FOR BACTERIA: CPT

## 2023-03-24 PROCEDURE — 85730 THROMBOPLASTIN TIME PARTIAL: CPT

## 2023-03-24 PROCEDURE — 83036 HEMOGLOBIN GLYCOSYLATED A1C: CPT

## 2023-03-24 PROCEDURE — 83735 ASSAY OF MAGNESIUM: CPT

## 2023-03-24 PROCEDURE — 86708 HEPATITIS A ANTIBODY: CPT

## 2023-03-24 PROCEDURE — 86900 BLOOD TYPING SEROLOGIC ABO: CPT

## 2023-03-24 PROCEDURE — U0005: CPT

## 2023-03-24 PROCEDURE — 87507 IADNA-DNA/RNA PROBE TQ 12-25: CPT

## 2023-03-24 PROCEDURE — C1729: CPT

## 2023-03-24 PROCEDURE — 93306 TTE W/DOPPLER COMPLETE: CPT

## 2023-03-24 PROCEDURE — 86663 EPSTEIN-BARR ANTIBODY: CPT

## 2023-03-24 PROCEDURE — 84702 CHORIONIC GONADOTROPIN TEST: CPT

## 2023-03-24 PROCEDURE — 81001 URINALYSIS AUTO W/SCOPE: CPT

## 2023-03-24 PROCEDURE — 93975 VASCULAR STUDY: CPT

## 2023-03-24 PROCEDURE — 80053 COMPREHEN METABOLIC PANEL: CPT

## 2023-03-24 PROCEDURE — 82945 GLUCOSE OTHER FLUID: CPT

## 2023-03-24 PROCEDURE — 84132 ASSAY OF SERUM POTASSIUM: CPT

## 2023-03-24 PROCEDURE — 86735 MUMPS ANTIBODY: CPT

## 2023-03-24 RX ADMIN — PIPERACILLIN AND TAZOBACTAM 25 GRAM(S): 4; .5 INJECTION, POWDER, LYOPHILIZED, FOR SOLUTION INTRAVENOUS at 06:33

## 2023-03-24 RX ADMIN — Medication 1 MILLIGRAM(S): at 11:04

## 2023-03-24 RX ADMIN — Medication 85 MILLIMOLE(S): at 11:04

## 2023-03-24 RX ADMIN — PANTOPRAZOLE SODIUM 40 MILLIGRAM(S): 20 TABLET, DELAYED RELEASE ORAL at 06:28

## 2023-03-24 RX ADMIN — Medication 100 MILLIGRAM(S): at 11:05

## 2023-03-24 RX ADMIN — Medication 1 TABLET(S): at 11:05

## 2023-03-24 RX ADMIN — Medication 102 MILLIGRAM(S): at 12:22

## 2023-03-24 NOTE — PROGRESS NOTE ADULT - PROVIDER SPECIALTY LIST ADULT
Cardiology
Cardiology
Transplant Hepatology
Cardiology
Hospitalist
Transplant Hepatology
Cardiology
Cardiology
Transplant Hepatology
Transplant ID
Transplant Hepatology
Internal Medicine
Hospitalist
Internal Medicine
Hospitalist

## 2023-03-24 NOTE — PROGRESS NOTE ADULT - PROBLEM SELECTOR PLAN 3
Meld NA 31  ETOH suspected but other serologies pending, budd chiari negative on US  -s/p para with 3.9 L removed, neg for SBP; SAAG > 1.1.   -US venous duplex (3/16) BL LE neg for DVT.  -workup: (-)HAV IgM; (-)HBV sAg; (-)HBV cAb IgM; (-)HCV Ab, MIKO neg, Alpha 1 antitrypsin neg,   - Blood type: O negative.    - TTE obtained on 3/16 - showed bileaflet mitral valve prolapse with moderate MR, EF 60-65%.  - Pending transfer to [facility] pending authorization from insurance insurance won't cover here.   Still needs: Cards eval, PT/BH/SW, EGD/Colon

## 2023-03-24 NOTE — PROGRESS NOTE ADULT - PROBLEM SELECTOR PLAN 6
could be 2/2 to portal hypertension  - Transthoracic Echocardiogram Normal left ventricular systolic function. No segmental wall motion abnormalities. - Possible bileaflet mitral valve prolapse. Moderate mitral  regurgitation.  - cardiology following  - b/l duplex neg for DVT  - Holding Lasix and Spironolactone as above
could be 2/2 to portal hypertension  - Transthoracic Echocardiogram Normal left ventricular systolic function. No segmental wall motion abnormalities. - Possible bileaflet mitral valve prolapse. Moderate mitral  regurgitation.  - cardiology following  - b/l duplex neg for DVT  - Holding Lasix and Spironolactone as above
lovenox 40
could be 2/2 to portal hypertension  - Transthoracic Echocardiogram Normal left ventricular systolic function. No segmental wall motion abnormalities. - Possible bileaflet mitral valve prolapse. Moderate mitral  regurgitation.  - cardiology following  - b/l duplex neg for DVT  - Holding Lasix and Spironolactone as above
lovenox 40

## 2023-03-24 NOTE — PROGRESS NOTE ADULT - NUTRITIONAL ASSESSMENT
This patient has been assessed with a concern for Malnutrition and has been determined to have a diagnosis/diagnoses of Severe protein-calorie malnutrition.    This patient is being managed with:   Diet Regular-  Low Sodium  Supplement Feeding Modality:  Oral  Ensure Plus High Protein Cans or Servings Per Day:  2       Frequency:  Daily  Entered: Mar 21 2023  2:03PM    Diet Regular-  DASH/TLC {Sodium & Cholesterol Restricted} (DASH)  Low Sodium  Entered: Mar 16 2023  4:55AM    The following pending diet order is being considered for treatment of Severe protein-calorie malnutrition:null
This patient has been assessed with a concern for Malnutrition and has been determined to have a diagnosis/diagnoses of Severe protein-calorie malnutrition.    This patient is being managed with:   Diet Regular-  Low Sodium  Supplement Feeding Modality:  Oral  Ensure Plus High Protein Cans or Servings Per Day:  2       Frequency:  Daily  Entered: Mar 21 2023  2:03PM    Diet Regular-  DASH/TLC {Sodium & Cholesterol Restricted} (DASH)  Low Sodium  Entered: Mar 16 2023  4:55AM    The following pending diet order is being considered for treatment of Severe protein-calorie malnutrition:null

## 2023-03-24 NOTE — PROGRESS NOTE ADULT - PROBLEM SELECTOR PROBLEM 6
Lower extremity edema
Need for prophylactic measure
Lower extremity edema
Need for prophylactic measure
Lower extremity edema

## 2023-03-24 NOTE — PROGRESS NOTE ADULT - PROBLEM SELECTOR PROBLEM 5
Lower extremity edema
Electrolyte disturbance
Need for prophylactic measure
Lower extremity edema
Need for prophylactic measure

## 2023-03-24 NOTE — DISCHARGE NOTE NURSING/CASE MANAGEMENT/SOCIAL WORK - PATIENT PORTAL LINK FT
You can access the FollowMyHealth Patient Portal offered by [facility] by registering at the following website: [email or web address]. By joining [facility]’s FollowMyHealth portal, you will also be able to view your health information using other applications (apps) compatible with our system.

## 2023-03-24 NOTE — PROGRESS NOTE ADULT - PROBLEM SELECTOR PLAN 7
DC lovenox for high bleeding risk  Encourage ambulation    Pending acceptance to [facility], discussed with  and son at bedside  ordered labs for today to check meld and electorlytes
DC lovenox for high bleeding risk  Encourage ambulation    Pending acceptance to [facility], discussed with  and son at bedside  ordered labs for today to check meld and electorlytes
DC lovenox for high bleeding risk  Encourage ambulation    Pending acceptance to [facility], discussed with  and son at bedside  ordered labs for today to check meld and electorlytes

## 2023-03-24 NOTE — PROGRESS NOTE ADULT - NS ATTEND AMEND GEN_ALL_CORE FT
Patient care and plan discussed and reviewed with Advanced Care Provider. Plan as outlined above edited by me to reflect our discussion.
58F, AUD 6-9 shots/d x 6 months attending rehab, init. admitted to [place] on 3/13/23 with jaundice, increased abdominal girth, and edema. Found ascites, CT suggested Budd Chiari, treated for UTI and SBP ppx, transferred to [facility] on 3/16 for OLT evaluation. Course: no Budd Chiari, liver w severe, very heterogeneous steatosis. OLT eval. started 3/17.    # alc cirrhosis, severe alcoholic hepatitis with ascites, MELD-29  - ascites: distended abdomen with air/ascites, 2+ edema, s/p para 3.9L (3/17), no SBP, protein 0.7 - at risk for SBP  - hypoalbuminemia, edema  - low BP, lung crackles, hypoxia 92% on Sat but CXR clear 3/16  - EV: unknown  - HE: ammonia 78 (3/17)  - HCC: -    #  severe protein calorie malnutrition: BUN 5, low phosphates  # leukocytosis WBC 15 < 13  # desaturated 92% despite lasix 20 PO, low BP 90    -- f/u ID workup - BCx, UA, will consider steroid, continue ceftriaxone for now  -- monitor Mg, phos, replete  -- AFP level  -- daily weights, continue diuretics for now
58F, AUD 6-9 shots/d x 6 months attending rehab, init. admitted to [place] on 3/13/23 with jaundice, increased abdominal girth, and edema. Found ascites, CT suggested Budd Chiari, treated for UTI and SBP ppx, transferred to [facility] on 3/16 for OLT evaluation. Course: no Budd Chiari, liver w severe, very heterogeneous steatosis. OLT eval. started 3/17.    # alc cirrhosis, severe alcoholic hepatitis with ascites, MELD-29  - ascites: distended abdomen with air/ascites, 2+ edema, s/p para 3.9L (3/17), no SBP, protein 0.7 - at risk for SBP  - hypoalbuminemia, edema  - low BP, lung crackles, hypoxia 92% on Sat but CXR clear 3/16  - EV: unknown  - HE: no asterixis  - HCC: -    #  severe protein calorie malnutrition: BUN 5, low phosphates  # leukocytosis WBC 15 < 13  # desaturated 92% despite lasix 20 PO, low BP 90    -- resume albumin 25%, 100 mL q8h; stop diuretics  -- obtain urine Na  -- f/u BCx 3/19, continue Zosyn; repeat US - repeat paracentesis with cell count and cultures  -- consult transplant ID  -- monitor Mg, phos, replete  -- AFP level  -- daily weights, continue diuretics for now

## 2023-03-24 NOTE — PROGRESS NOTE ADULT - PROBLEM SELECTOR PLAN 5
replete Mag, Phos and potassium PRN
could be 2/2 to portal hypertension  - Transthoracic Echocardiogram Normal left ventricular systolic function. No segmental wall motion abnormalities. - Possible bileaflet mitral valve prolapse. Moderate mitral  regurgitation.  - cardiology following  - b/l duplex neg for DVT  - Holding Lasix and Spironolactone as above
replete Mag, Phos and potassium PRN
lovenox 40
could be 2/2 to portal hypertension  - Transthoracic Echocardiogram Normal left ventricular systolic function. No segmental wall motion abnormalities. - Possible bileaflet mitral valve prolapse. Moderate mitral  regurgitation.  - cardiology following  - b/l duplex neg for DVT  - Holding Lasix and Spironolactone as above
ordered labs, replete Mag, Phos and potassium
lovenox 40

## 2023-03-24 NOTE — PROGRESS NOTE ADULT - SUBJECTIVE AND OBJECTIVE BOX
DATE OF SERVICE: 03-24-23 @ 12:53    Patient is a 58y old  Female who presents with a chief complaint of liver failure (24 Mar 2023 11:14)      INTERVAL HISTORY: Feels ok.     REVIEW OF SYSTEMS:  CONSTITUTIONAL: No weakness  EYES/ENT: No visual changes;  No throat pain   NECK: No pain or stiffness  RESPIRATORY: No cough, wheezing; No shortness of breath  CARDIOVASCULAR: No chest pain or palpitations  GASTROINTESTINAL: No abdominal  pain. No nausea, vomiting, or hematemesis  GENITOURINARY: No dysuria, frequency or hematuria  NEUROLOGICAL: No stroke like symptoms  SKIN: No rashes    	  MEDICATIONS:        PHYSICAL EXAM:  T(C): 36.6 (03-24-23 @ 11:39), Max: 37.4 (03-23-23 @ 17:21)  HR: 99 (03-24-23 @ 11:39) (90 - 99)  BP: 94/65 (03-24-23 @ 11:39) (92/60 - 108/62)  RR: 18 (03-24-23 @ 11:39) (18 - 18)  SpO2: 98% (03-24-23 @ 11:39) (95% - 98%)  Wt(kg): --  I&O's Summary    23 Mar 2023 07:01  -  24 Mar 2023 07:00  --------------------------------------------------------  IN: 1160 mL / OUT: 0 mL / NET: 1160 mL    24 Mar 2023 07:01  -  24 Mar 2023 12:53  --------------------------------------------------------  IN: 240 mL / OUT: 0 mL / NET: 240 mL          Appearance: In no distress, jaundiced	  HEENT:    PERRL, EOMI	  Cardiovascular:  S1 S2, No JVD  Respiratory: Lungs clear to auscultation	  Gastrointestinal:  Soft, Non-tender, + BS	  Vascularature:  + edema of LE  Psychiatric: Appropriate affect   Neuro: no acute focal deficits                               10.7   21.37 )-----------( Clumped    ( 24 Mar 2023 07:09 )             30.7     03-24    137  |  103  |  6<L>  ----------------------------<  96  3.8   |  22  |  <0.30<L>    Ca    8.1<L>      24 Mar 2023 07:08  Phos  1.7     03-24  Mg     2.0     03-24    TPro  5.6<L>  /  Alb  3.5  /  TBili  28.1<H>  /  DBili  x   /  AST  111<H>  /  ALT  35  /  AlkPhos  104  03-24        Labs personally reviewed      ASSESSMENT/PLAN: Patient for transfer to [place] for further LT workup. 	      58F EtOH abuse,  transferred from [facility]  for acute liver failure.     Problem/Plan - 1:  ·  Problem: Lower extremity edema.  ·  Plan:   Echocardiogram. 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  Family considering transfer to [facility] for further Liver TP workup.   3/23 s/p Lasix 80 IVP with goof clinical response  Likely 2/2 portal hypertension, less likely 2/2 HF -- consider initiation of non-selective BB  C/w Diuresis PRN    Problem/Plan - 2:  ·  Problem: Acute hypoxemic respiratory failure.  ·  Plan: 2/2 to pleural effusions   Diuresis PRN  CXR pending  wean 02 to maintain >92%, currently on 2.5L saturating well.    Problem/Plan - 3:  ·  Problem: Need for prophylactic measure.   ·  Plan: lovenox 40.              Shannan Hilton, MARU-NP   Jeffrey Melo DO [facility]  Cardiovascular Medicine  [address], [address]  Available through call or text on [employer]  Office: [phone]

## 2023-03-24 NOTE — PROGRESS NOTE ADULT - SUBJECTIVE AND OBJECTIVE BOX
PROGRESS NOTE:   Prabha Dias DO  Hospitalist  Pager [phone]  After 5pm/weekends or if no answer ext: [phone]      Patient is a 58y old  Female who presents with a chief complaint of liver failure (23 Mar 2023 16:56)      SUBJECTIVE / OVERNIGHT EVENTS:  ASHLEY    ADDITIONAL REVIEW OF SYSTEMS:  no fever or chills no n/v/d    MEDICATIONS  (STANDING):  folic acid 1 milliGRAM(s) Oral daily  multivitamin 1 Tablet(s) Oral daily  pantoprazole    Tablet 40 milliGRAM(s) Oral before breakfast  phytonadione  IVPB 10 milliGRAM(s) IV Intermittent daily  thiamine 100 milliGRAM(s) Oral daily    MEDICATIONS  (PRN):  melatonin 3 milliGRAM(s) Oral at bedtime PRN Insomnia  ondansetron Injectable 4 milliGRAM(s) IV Push every 8 hours PRN Nausea and/or Vomiting      CAPILLARY BLOOD GLUCOSE        I&O's Summary    23 Mar 2023 07:01  -  24 Mar 2023 07:00  --------------------------------------------------------  IN: 1160 mL / OUT: 0 mL / NET: 1160 mL    24 Mar 2023 07:01  -  24 Mar 2023 11:14  --------------------------------------------------------  IN: 240 mL / OUT: 0 mL / NET: 240 mL        PHYSICAL EXAM:  Vital Signs Last 24 Hrs  T(C): 36.9 (24 Mar 2023 04:35), Max: 37.4 (23 Mar 2023 17:21)  T(F): 98.4 (24 Mar 2023 04:35), Max: 99.4 (23 Mar 2023 17:21)  HR: 92 (24 Mar 2023 04:35) (90 - 98)  BP: 96/65 (24 Mar 2023 04:35) (92/60 - 108/62)  BP(mean): --  RR: 18 (24 Mar 2023 04:35) (18 - 18)  SpO2: 95% (24 Mar 2023 04:35) (95% - 97%)    Parameters below as of 24 Mar 2023 04:35  Patient On (Oxygen Delivery Method): room air        CONSTITUTIONAL: NAD, well-developed, jaundice, awake and alert answering questions  RESPIRATORY: Normal respiratory effort; lungs are clear to auscultation bilaterally  CARDIOVASCULAR: Regular rate and rhythm, normal S1 and S2, no murmur/rub/gallop; No lower extremity edema; Peripheral pulses are 2+ bilaterally  ABDOMEN: Nontender to palpation, normoactive bowel sounds, no rebound/guarding; No hepatosplenomegaly  MUSCLOSKELETAL: no clubbing or cyanosis of digits; no joint swelling or tenderness to palpation      LABS:                        10.7   21.37 )-----------( Clumped    ( 24 Mar 2023 07:09 )             30.7     03-24    137  |  103  |  6<L>  ----------------------------<  96  3.8   |  22  |  <0.30<L>    Ca    8.1<L>      24 Mar 2023 07:08  Phos  1.7     03-24  Mg     2.0     03-24    TPro  5.6<L>  /  Alb  3.5  /  TBili  28.1<H>  /  DBili  x   /  AST  111<H>  /  ALT  35  /  AlkPhos  104  03-24    PT/INR - ( 24 Mar 2023 07:11 )   PT: 30.0 sec;   INR: 2.58 ratio         PTT - ( 24 Mar 2023 07:11 )  PTT:51.7 sec            RADIOLOGY & ADDITIONAL TESTS:  Results Reviewed:   Imaging Personally Reviewed:  Electrocardiogram Personally Reviewed:    COORDINATION OF CARE:  Care Discussed with Consultants/Other Providers [Y/N]:  Prior or Outpatient Records Reviewed [Y/N]:

## 2023-03-24 NOTE — PROGRESS NOTE ADULT - REASON FOR ADMISSION
liver failure

## 2023-03-28 LAB
LYSOSOMAL ACID LIPASE INTERPRETATION: SIGNIFICANT CHANGE UP
LYSOSOMAL ACID LIPASE, RESULT: 168 CD:384473389 — SIGNIFICANT CHANGE UP (ref 69–203)

## 2023-03-29 LAB
GAMMA INTERFERON BACKGROUND BLD IA-ACNC: 0.03 IU/ML — SIGNIFICANT CHANGE UP
M TB IFN-G BLD-IMP: NEGATIVE — SIGNIFICANT CHANGE UP
M TB IFN-G CD4+ BCKGRND COR BLD-ACNC: -0.01 IU/ML — SIGNIFICANT CHANGE UP
M TB IFN-G CD4+CD8+ BCKGRND COR BLD-ACNC: -0.01 IU/ML — SIGNIFICANT CHANGE UP
PHOSPHATIDYLETHANOL (PETH) - RESULT: 903 NG/ML — HIGH
QUANT TB PLUS MITOGEN MINUS NIL: 0.72 IU/ML — SIGNIFICANT CHANGE UP

## 2023-04-04 ENCOUNTER — NON-APPOINTMENT (OUTPATIENT)
Age: 58
End: 2023-04-04

## 2023-04-15 LAB
CULTURE RESULTS: SIGNIFICANT CHANGE UP
SPECIMEN SOURCE: SIGNIFICANT CHANGE UP

## 2023-05-15 NOTE — PROGRESS NOTE ADULT - PROBLEM/PLAN-7
Problem: At Risk for Falls  Goal: # Patient does not fall  Outcome: Outcome Not Met, Continue to Monitor  Goal: # Takes action to control fall-related risks  Outcome: Outcome Not Met, Continue to Monitor  Goal: # Verbalizes understanding of fall risk/precautions  Description: Document education using the patient education activity  Outcome: Outcome Not Met, Continue to Monitor     Problem: At Risk for Injury Due to Fall  Goal: # Patient does not fall  Outcome: Outcome Not Met, Continue to Monitor  Goal: # Takes action to control condition specific risks  Outcome: Outcome Not Met, Continue to Monitor  Goal: # Verbalizes understanding of fall-related injury personal risks  Description: Document education using the patient education activity  Outcome: Outcome Not Met, Continue to Monitor     
DISPLAY PLAN FREE TEXT

## 2025-05-12 NOTE — PATIENT PROFILE ADULT - LIVING ENVIRONMENT
Left pulmonary artery and lower angiogram was performed using 5 ml's of total contrast by power injection @ 5 ml's per second. no